# Patient Record
Sex: FEMALE | Race: BLACK OR AFRICAN AMERICAN | NOT HISPANIC OR LATINO | Employment: UNEMPLOYED | URBAN - METROPOLITAN AREA
[De-identification: names, ages, dates, MRNs, and addresses within clinical notes are randomized per-mention and may not be internally consistent; named-entity substitution may affect disease eponyms.]

---

## 2020-03-18 ENCOUNTER — HOSPITAL ENCOUNTER (OUTPATIENT)
Dept: URGENT CARE | Facility: CLINIC | Age: 30
Discharge: HOME | End: 2020-03-18

## 2020-03-20 LAB — BACTERIA SPEC AEROBE CULT: NORMAL

## 2022-01-03 ENCOUNTER — INITIAL PRENATAL (OUTPATIENT)
Dept: OBSTETRICS AND GYNECOLOGY | Facility: CLINIC | Age: 32
End: 2022-01-03

## 2022-01-03 VITALS — SYSTOLIC BLOOD PRESSURE: 139 MMHG | DIASTOLIC BLOOD PRESSURE: 95 MMHG | WEIGHT: 213 LBS

## 2022-01-03 DIAGNOSIS — Z34.80 SUPERVISION OF OTHER NORMAL PREGNANCY: Primary | ICD-10-CM

## 2022-01-03 LAB
GLUCOSE UR STRIP-MCNC: NEGATIVE MG/DL
PROT UR STRIP-MCNC: NEGATIVE MG/DL

## 2022-01-03 PROCEDURE — 0501F PRENATAL FLOW SHEET: CPT | Performed by: OBSTETRICS & GYNECOLOGY

## 2022-01-03 RX ORDER — PRENATAL WITH FERROUS FUM AND FOLIC ACID 3080; 920; 120; 400; 22; 1.84; 3; 20; 10; 1; 12; 200; 27; 25; 2 [IU]/1; [IU]/1; MG/1; [IU]/1; MG/1; MG/1; MG/1; MG/1; MG/1; MG/1; UG/1; MG/1; MG/1; MG/1; MG/1
1 TABLET ORAL DAILY
COMMUNITY
Start: 2021-12-28 | End: 2022-03-10 | Stop reason: SDUPTHER

## 2022-01-03 RX ORDER — ONDANSETRON 4 MG/1
4 TABLET, FILM COATED ORAL
COMMUNITY
Start: 2022-01-03 | End: 2022-01-10

## 2022-01-03 NOTE — PROGRESS NOTES
Ultrasound today 8 weeks 5 days gestation with EDC 8/10/2022, which is only 6 days different than LMP EDC 2022 which patient was pretty certain about.  Fetal heart rate 171.  When scheduling repeat , will use 2022 EDC in order to be conservative.

## 2022-01-03 NOTE — PROGRESS NOTES
CC initial OB visit for this 31-year-old G3, P1 miscarriage 1 female, 50% Afro-American, who had a previous  for failure to progress desiring repeat , and her last pregnancy resulted in a spontaneous miscarriage requiring D&C.  Patient is extremely anxious today due to her previous miscarriage and therefore her blood pressure was slightly elevated as she has no history.  She is on prenatal vitamins.  Discussed the possibility of CF and fetal DNA testing but she is not interested at this time as VIP would never be desired.  She is due for a Pap smear.  Review of Systems - ENT ROS: negative  Hematological and Lymphatic ROS: negative  Endocrine ROS: negative  Breast ROS: negative  Respiratory ROS: negative  Cardiovascular ROS: negative  Gastrointestinal ROS: negative  Genito-Urinary ROS: negative  Musculoskeletal ROS: negative  Neurological ROS: negative  Dermatological ROS: negative  Assessment.  7 weeks 6 days gestation with history of previous  planning repeat   Plan.  Ultrasound today for dating and viability, urine culture, Pap with DNA, CARMEN globin electrophoresis, hemoglobin A1c, TFTs, urine culture, prenatal 3, routine drug screen, return to office 4 weeks if ultrasound fine today.

## 2022-01-04 LAB
ABO GROUP BLD: ABNORMAL
AMPHETAMINES UR QL SCN: NEGATIVE NG/ML
BARBITURATES UR QL SCN: NEGATIVE NG/ML
BASOPHILS # BLD AUTO: 0.1 X10E3/UL (ref 0–0.2)
BASOPHILS NFR BLD AUTO: 1 %
BENZODIAZ UR QL: NEGATIVE NG/ML
BLD GP AB SCN SERPL QL: NEGATIVE
BZE UR QL: NEGATIVE NG/ML
CANNABINOIDS UR QL SCN: NEGATIVE NG/ML
EOSINOPHIL # BLD AUTO: 0.1 X10E3/UL (ref 0–0.4)
EOSINOPHIL NFR BLD AUTO: 1 %
ERYTHROCYTE [DISTWIDTH] IN BLOOD BY AUTOMATED COUNT: 13.2 % (ref 11.7–15.4)
EST. AVERAGE GLUCOSE BLD GHB EST-MCNC: 111 MG/DL
FT4I SERPL CALC-MCNC: 1.7 (ref 1.2–4.9)
HBA1C MFR BLD: 5.5 % (ref 4.8–5.6)
HBV SURFACE AG SERPL QL IA: NEGATIVE
HCT VFR BLD AUTO: 41.3 % (ref 34–46.6)
HCV AB S/CO SERPL IA: <0.1 S/CO RATIO (ref 0–0.9)
HGB A MFR BLD ELPH: 97.4 % (ref 96.4–98.8)
HGB A2 MFR BLD ELPH: 2.6 % (ref 1.8–3.2)
HGB BLD-MCNC: 14.4 G/DL (ref 11.1–15.9)
HGB F MFR BLD ELPH: 0 % (ref 0–2)
HGB FRACT BLD-IMP: NORMAL
HGB S MFR BLD ELPH: 0 %
HIV 1+2 AB+HIV1 P24 AG SERPL QL IA: NON REACTIVE
IMM GRANULOCYTES # BLD AUTO: 0.1 X10E3/UL (ref 0–0.1)
IMM GRANULOCYTES NFR BLD AUTO: 1 %
LYMPHOCYTES # BLD AUTO: 2.8 X10E3/UL (ref 0.7–3.1)
LYMPHOCYTES NFR BLD AUTO: 24 %
MCH RBC QN AUTO: 29.8 PG (ref 26.6–33)
MCHC RBC AUTO-ENTMCNC: 34.9 G/DL (ref 31.5–35.7)
MCV RBC AUTO: 85 FL (ref 79–97)
METHADONE UR QL SCN: NEGATIVE NG/ML
MONOCYTES # BLD AUTO: 0.9 X10E3/UL (ref 0.1–0.9)
MONOCYTES NFR BLD AUTO: 8 %
NEUTROPHILS # BLD AUTO: 7.6 X10E3/UL (ref 1.4–7)
NEUTROPHILS NFR BLD AUTO: 65 %
OPIATES UR QL: NEGATIVE NG/ML
PCP UR QL: NEGATIVE NG/ML
PLATELET # BLD AUTO: 287 X10E3/UL (ref 150–450)
PROPOXYPH UR QL SCN: NEGATIVE NG/ML
RBC # BLD AUTO: 4.84 X10E6/UL (ref 3.77–5.28)
RH BLD: POSITIVE
RPR SER QL: NON REACTIVE
RUBV IGG SERPL IA-ACNC: 2.25 INDEX
T3RU NFR SERPL: 22 % (ref 24–39)
T4 SERPL-MCNC: 7.6 UG/DL (ref 4.5–12)
TSH SERPL DL<=0.005 MIU/L-ACNC: 1.36 UIU/ML (ref 0.45–4.5)
WBC # BLD AUTO: 11.4 X10E3/UL (ref 3.4–10.8)

## 2022-01-05 LAB
BACTERIA UR CULT: NORMAL
BACTERIA UR CULT: NORMAL
C TRACH RRNA CVX QL NAA+PROBE: NEGATIVE
CONV .: NORMAL
CYTOLOGIST CVX/VAG CYTO: NORMAL
CYTOLOGY CVX/VAG DOC CYTO: NORMAL
CYTOLOGY CVX/VAG DOC THIN PREP: NORMAL
DX ICD CODE: NORMAL
HIV 1 & 2 AB SER-IMP: NORMAL
N GONORRHOEA RRNA CVX QL NAA+PROBE: NEGATIVE
OTHER STN SPEC: NORMAL
STAT OF ADQ CVX/VAG CYTO-IMP: NORMAL

## 2022-01-27 ENCOUNTER — ROUTINE PRENATAL (OUTPATIENT)
Dept: OBSTETRICS AND GYNECOLOGY | Facility: CLINIC | Age: 32
End: 2022-01-27

## 2022-01-27 VITALS — SYSTOLIC BLOOD PRESSURE: 125 MMHG | DIASTOLIC BLOOD PRESSURE: 81 MMHG | WEIGHT: 211 LBS

## 2022-01-27 DIAGNOSIS — Z34.80 SUPERVISION OF OTHER NORMAL PREGNANCY: Primary | ICD-10-CM

## 2022-01-27 LAB
GLUCOSE UR STRIP-MCNC: NEGATIVE MG/DL
PROT UR STRIP-MCNC: NEGATIVE MG/DL

## 2022-01-27 PROCEDURE — 0502F SUBSEQUENT PRENATAL CARE: CPT | Performed by: OBSTETRICS & GYNECOLOGY

## 2022-01-27 NOTE — PROGRESS NOTES
CC follow-up OB visit.  Patient anxious because she had a previous miscarriage and did have some cramping without bleeding.  No other problems.  Has decided against CF and Materna 21 fetal DNA testing as never interested in VIP anyway.  Good fetal heart tones today 164.  Cervix checked and long and closed.  Return to office 4 weeks.

## 2022-01-29 LAB
BACTERIA UR CULT: NORMAL
BACTERIA UR CULT: NORMAL

## 2022-01-31 ENCOUNTER — TELEPHONE (OUTPATIENT)
Dept: OBSTETRICS AND GYNECOLOGY | Facility: CLINIC | Age: 32
End: 2022-01-31

## 2022-01-31 NOTE — TELEPHONE ENCOUNTER
----- Message from Darby Ferreira MD sent at 1/31/2022  8:03 AM EST -----  Please let patient know that her urine culture was negative for infection

## 2022-03-02 ENCOUNTER — ROUTINE PRENATAL (OUTPATIENT)
Dept: OBSTETRICS AND GYNECOLOGY | Facility: CLINIC | Age: 32
End: 2022-03-02

## 2022-03-02 VITALS
DIASTOLIC BLOOD PRESSURE: 83 MMHG | SYSTOLIC BLOOD PRESSURE: 126 MMHG | WEIGHT: 212 LBS | BODY MASS INDEX: 33.27 KG/M2 | HEIGHT: 67 IN

## 2022-03-02 DIAGNOSIS — Z34.80 SUPERVISION OF OTHER NORMAL PREGNANCY: Primary | ICD-10-CM

## 2022-03-02 LAB
GLUCOSE UR STRIP-MCNC: NEGATIVE MG/DL
PROT UR STRIP-MCNC: NEGATIVE MG/DL

## 2022-03-02 PROCEDURE — 90686 IIV4 VACC NO PRSV 0.5 ML IM: CPT | Performed by: OBSTETRICS & GYNECOLOGY

## 2022-03-02 PROCEDURE — 90471 IMMUNIZATION ADMIN: CPT | Performed by: OBSTETRICS & GYNECOLOGY

## 2022-03-02 PROCEDURE — 0502F SUBSEQUENT PRENATAL CARE: CPT | Performed by: OBSTETRICS & GYNECOLOGY

## 2022-03-02 NOTE — PROGRESS NOTES
CC follow-up OB visit.  Good fetal movement and fetal heart rate.  aFP refused.  Does want flu shot today.  No current problems.  Assessment.  16 weeks 1 day gestation with history of previous  planning repeat   Plan.  Return to office 4 weeks with anatomy scan.

## 2022-03-10 RX ORDER — PRENATAL WITH FERROUS FUM AND FOLIC ACID 3080; 920; 120; 400; 22; 1.84; 3; 20; 10; 1; 12; 200; 27; 25; 2 [IU]/1; [IU]/1; MG/1; [IU]/1; MG/1; MG/1; MG/1; MG/1; MG/1; MG/1; UG/1; MG/1; MG/1; MG/1; MG/1
1 TABLET ORAL DAILY
Qty: 30 TABLET | Refills: 11 | Status: SHIPPED | OUTPATIENT
Start: 2022-03-10 | End: 2023-03-28

## 2022-03-31 ENCOUNTER — ROUTINE PRENATAL (OUTPATIENT)
Dept: OBSTETRICS AND GYNECOLOGY | Facility: CLINIC | Age: 32
End: 2022-03-31

## 2022-03-31 VITALS — BODY MASS INDEX: 33.61 KG/M2 | SYSTOLIC BLOOD PRESSURE: 120 MMHG | DIASTOLIC BLOOD PRESSURE: 86 MMHG | WEIGHT: 214.6 LBS

## 2022-03-31 DIAGNOSIS — Z34.80 SUPERVISION OF OTHER NORMAL PREGNANCY: Primary | ICD-10-CM

## 2022-03-31 LAB
GLUCOSE UR STRIP-MCNC: NEGATIVE MG/DL
PROT UR STRIP-MCNC: ABNORMAL MG/DL

## 2022-03-31 PROCEDURE — 0502F SUBSEQUENT PRENATAL CARE: CPT | Performed by: OBSTETRICS & GYNECOLOGY

## 2022-03-31 NOTE — PROGRESS NOTES
CC follow-up OB visit.  Normal anatomy screen today.  Cervical length 6 cm.  Fetal heart rate 159.  Anterior low-lying placenta.  Good fetal movement and growth.  Return to office 4 weeks with 1 hour glucose.

## 2022-04-29 PROCEDURE — 99282 EMERGENCY DEPT VISIT SF MDM: CPT

## 2022-04-30 ENCOUNTER — HOSPITAL ENCOUNTER (EMERGENCY)
Facility: HOSPITAL | Age: 32
Discharge: HOME | End: 2022-04-30
Attending: EMERGENCY MEDICINE | Admitting: EMERGENCY MEDICINE

## 2022-04-30 VITALS
TEMPERATURE: 97.8 F | SYSTOLIC BLOOD PRESSURE: 126 MMHG | HEIGHT: 67 IN | HEART RATE: 96 BPM | OXYGEN SATURATION: 100 % | DIASTOLIC BLOOD PRESSURE: 82 MMHG | WEIGHT: 222.44 LBS | RESPIRATION RATE: 20 BRPM | BODY MASS INDEX: 34.91 KG/M2

## 2022-04-30 DIAGNOSIS — R21 GENERALIZED RASH: Primary | ICD-10-CM

## 2022-05-04 ENCOUNTER — LAB (OUTPATIENT)
Dept: OBSTETRICS AND GYNECOLOGY | Facility: CLINIC | Age: 32
End: 2022-05-04

## 2022-05-04 ENCOUNTER — ROUTINE PRENATAL (OUTPATIENT)
Dept: OBSTETRICS AND GYNECOLOGY | Facility: CLINIC | Age: 32
End: 2022-05-04

## 2022-05-04 VITALS — WEIGHT: 221.6 LBS | BODY MASS INDEX: 34.71 KG/M2 | DIASTOLIC BLOOD PRESSURE: 84 MMHG | SYSTOLIC BLOOD PRESSURE: 118 MMHG

## 2022-05-04 DIAGNOSIS — Z98.891 HISTORY OF C-SECTION: Primary | ICD-10-CM

## 2022-05-04 DIAGNOSIS — Z34.80 SUPERVISION OF OTHER NORMAL PREGNANCY: ICD-10-CM

## 2022-05-04 LAB
GLUCOSE UR STRIP-MCNC: NEGATIVE MG/DL
PROT UR STRIP-MCNC: NEGATIVE MG/DL

## 2022-05-04 PROCEDURE — 0502F SUBSEQUENT PRENATAL CARE: CPT | Performed by: OBSTETRICS & GYNECOLOGY

## 2022-05-04 RX ORDER — SODIUM CHLORIDE 0.9 % (FLUSH) 0.9 %
10 SYRINGE (ML) INJECTION AS NEEDED
Status: CANCELLED | OUTPATIENT
Start: 2022-05-04

## 2022-05-04 RX ORDER — METHYLERGONOVINE MALEATE 0.2 MG/ML
200 INJECTION INTRAVENOUS ONCE AS NEEDED
Status: CANCELLED | OUTPATIENT
Start: 2022-05-04

## 2022-05-04 RX ORDER — LIDOCAINE HYDROCHLORIDE 10 MG/ML
5 INJECTION, SOLUTION EPIDURAL; INFILTRATION; INTRACAUDAL; PERINEURAL AS NEEDED
Status: CANCELLED | OUTPATIENT
Start: 2022-05-04

## 2022-05-04 RX ORDER — SODIUM CHLORIDE 0.9 % (FLUSH) 0.9 %
10 SYRINGE (ML) INJECTION EVERY 12 HOURS SCHEDULED
Status: CANCELLED | OUTPATIENT
Start: 2022-05-04

## 2022-05-04 RX ORDER — CARBOPROST TROMETHAMINE 250 UG/ML
250 INJECTION, SOLUTION INTRAMUSCULAR
Status: CANCELLED | OUTPATIENT
Start: 2022-05-04

## 2022-05-04 RX ORDER — MISOPROSTOL 100 UG/1
800 TABLET ORAL ONCE AS NEEDED
Status: CANCELLED | OUTPATIENT
Start: 2022-05-04

## 2022-05-04 NOTE — PROGRESS NOTES
CC follow-up OB visit.  Good fetal movement and growth.  1 hour glucose today.  Planning repeat  2022 without tubal ligation.  Return to office 4 weeks.

## 2022-05-05 PROBLEM — Z98.891 HISTORY OF C-SECTION: Status: ACTIVE | Noted: 2022-05-05

## 2022-05-05 LAB
BASOPHILS # BLD AUTO: 0.1 X10E3/UL (ref 0–0.2)
BASOPHILS NFR BLD AUTO: 1 %
BLD GP AB SCN SERPL QL: NEGATIVE
EOSINOPHIL # BLD AUTO: 0.2 X10E3/UL (ref 0–0.4)
EOSINOPHIL NFR BLD AUTO: 1 %
ERYTHROCYTE [DISTWIDTH] IN BLOOD BY AUTOMATED COUNT: 13.1 % (ref 11.7–15.4)
GLUCOSE 1H P 50 G GLC PO SERPL-MCNC: 93 MG/DL (ref 65–139)
HCT VFR BLD AUTO: 38.7 % (ref 34–46.6)
HGB BLD-MCNC: 13 G/DL (ref 11.1–15.9)
IMM GRANULOCYTES # BLD AUTO: 0.2 X10E3/UL (ref 0–0.1)
IMM GRANULOCYTES NFR BLD AUTO: 2 %
LYMPHOCYTES # BLD AUTO: 2 X10E3/UL (ref 0.7–3.1)
LYMPHOCYTES NFR BLD AUTO: 16 %
MCH RBC QN AUTO: 29.3 PG (ref 26.6–33)
MCHC RBC AUTO-ENTMCNC: 33.6 G/DL (ref 31.5–35.7)
MCV RBC AUTO: 87 FL (ref 79–97)
MONOCYTES # BLD AUTO: 0.8 X10E3/UL (ref 0.1–0.9)
MONOCYTES NFR BLD AUTO: 6 %
NEUTROPHILS # BLD AUTO: 9.4 X10E3/UL (ref 1.4–7)
NEUTROPHILS NFR BLD AUTO: 74 %
PLATELET # BLD AUTO: 262 X10E3/UL (ref 150–450)
RBC # BLD AUTO: 4.44 X10E6/UL (ref 3.77–5.28)
WBC # BLD AUTO: 12.6 X10E3/UL (ref 3.4–10.8)

## 2022-06-01 ENCOUNTER — ROUTINE PRENATAL (OUTPATIENT)
Dept: OBSTETRICS AND GYNECOLOGY | Facility: CLINIC | Age: 32
End: 2022-06-01

## 2022-06-01 VITALS — DIASTOLIC BLOOD PRESSURE: 87 MMHG | WEIGHT: 225 LBS | SYSTOLIC BLOOD PRESSURE: 136 MMHG | BODY MASS INDEX: 35.24 KG/M2

## 2022-06-01 DIAGNOSIS — Z34.93 PRENATAL CARE IN THIRD TRIMESTER: ICD-10-CM

## 2022-06-01 DIAGNOSIS — Z98.891 HISTORY OF C-SECTION: ICD-10-CM

## 2022-06-01 DIAGNOSIS — Z3A.29 29 WEEKS GESTATION OF PREGNANCY: Primary | ICD-10-CM

## 2022-06-01 LAB
GLUCOSE UR STRIP-MCNC: NEGATIVE MG/DL
GLUCOSE UR STRIP-MCNC: NEGATIVE MG/DL
PROT UR STRIP-MCNC: ABNORMAL MG/DL

## 2022-06-01 PROCEDURE — 90715 TDAP VACCINE 7 YRS/> IM: CPT | Performed by: STUDENT IN AN ORGANIZED HEALTH CARE EDUCATION/TRAINING PROGRAM

## 2022-06-01 PROCEDURE — 90471 IMMUNIZATION ADMIN: CPT | Performed by: STUDENT IN AN ORGANIZED HEALTH CARE EDUCATION/TRAINING PROGRAM

## 2022-06-01 PROCEDURE — 0502F SUBSEQUENT PRENATAL CARE: CPT | Performed by: STUDENT IN AN ORGANIZED HEALTH CARE EDUCATION/TRAINING PROGRAM

## 2022-06-01 NOTE — PROGRESS NOTES
Chief Complaint   Patient presents with   • Routine Prenatal Visit      America Andres is a 31 y.o.  at 29w1d who presents for routine prenatal visit. She reports doing well and denies any complaints today. Denies vaginal bleeding, cramping, contractions, LOF. She reports active fetal movement.     /87   Wt 102 kg (225 lb)   LMP 2021   BMI 35.24 kg/m²    Gen: well appearing, NAD   Abd: gravid, nontender  See OB Flowsheet    ASSESSMENT:   1. IUP at 29w1d   2. Prenatal care in third trimester  3. History of C/S x 1   4. Anxiety - on zoloft 50 mg daily     PLAN:  Problem list reviewed and updated.   Reviewed expectations of this stage of pregnancy.   Third trimester precautions reviewed including labor signs, monitoring fetal movements.  Tdap discussed and given today.   Scheduled for repeat  section with Dr. Zhou on 22.   Return in about 2 weeks (around 6/15/2022) for prenatal visit with Dr. Zhou .    Patient Active Problem List    Diagnosis Date Noted   • History of  2022     Note Last Updated: 2022     Added automatically from request for surgery [id number]         Orders Placed This Encounter   Procedures   • Tdap Vaccine Greater Than or Equal To 8yo IM   • POC Urinalysis Dipstick     This is an external result entered through the Results Console.     Order Specific Question:   Release to patient     Answer:   Immediate   • POC Urinalysis Dipstick     This is an external result entered through the Results Console.     Order Specific Question:   Release to patient     Answer:   Immediate     Selena Vergara MD

## 2022-06-23 ENCOUNTER — ROUTINE PRENATAL (OUTPATIENT)
Dept: OBSTETRICS AND GYNECOLOGY | Facility: CLINIC | Age: 32
End: 2022-06-23

## 2022-06-23 VITALS — BODY MASS INDEX: 35.71 KG/M2 | WEIGHT: 228 LBS | DIASTOLIC BLOOD PRESSURE: 87 MMHG | SYSTOLIC BLOOD PRESSURE: 133 MMHG

## 2022-06-23 DIAGNOSIS — Z3A.32 32 WEEKS GESTATION OF PREGNANCY: Primary | ICD-10-CM

## 2022-06-23 DIAGNOSIS — R03.0 ELEVATED BP WITHOUT DIAGNOSIS OF HYPERTENSION: ICD-10-CM

## 2022-06-23 DIAGNOSIS — Z98.891 HISTORY OF C-SECTION: ICD-10-CM

## 2022-06-23 DIAGNOSIS — F41.9 ANXIETY: ICD-10-CM

## 2022-06-23 LAB
GLUCOSE UR STRIP-MCNC: NEGATIVE MG/DL
PROT UR STRIP-MCNC: NEGATIVE MG/DL

## 2022-06-23 PROCEDURE — 0502F SUBSEQUENT PRENATAL CARE: CPT | Performed by: NURSE PRACTITIONER

## 2022-06-23 NOTE — PROGRESS NOTES
Chief Complaint   Patient presents with   • Routine Prenatal Visit     OB follow up     America Andres is a 31 y.o.  32w2d being seen today for her obstetrical visit.  Patient reports no complaints. Fetal movement: normal. BP is borderline range today, denies current HA, however she has had sinus infection with c/o intermittent HA which is relieved by tylenol. Denies vision changes, or RUQ pain    Review of Systems  Cramping/contractions: denies  Vaginal bleeding: denies  Fetal movement: normal    /87   Wt 103 kg (228 lb)   LMP 2021   BMI 35.71 kg/m²     Assessment/Plan    Diagnoses and all orders for this visit:    1. 32 weeks gestation of pregnancy (Primary)  -     US ob follow up transabdominal approach; Future    2. History of     3. Anxiety    4. Elevated BP without diagnosis of hypertension       Scheduled repeat c/s 22   Reviewed fetal kick counts  Reviewed preeclampsia precautions  Discussed S&S PTL  Encouraged proper hydration  Growth scan at next visit  Reviewed this stage of pregnancy  Problem list updated     Follow up in 2 week(s) with Dr. Vicente    I have spent 20 min in face to face time with the patient and 20 min of this time was spent in counseling on the above stated issues.    Rossy Hare, APRN  2022  13:33 EDT

## 2022-06-27 DIAGNOSIS — O40.3XX0 POLYHYDRAMNIOS IN THIRD TRIMESTER COMPLICATION, SINGLE OR UNSPECIFIED FETUS: Primary | ICD-10-CM

## 2022-07-07 ENCOUNTER — ROUTINE PRENATAL (OUTPATIENT)
Dept: OBSTETRICS AND GYNECOLOGY | Facility: CLINIC | Age: 32
End: 2022-07-07

## 2022-07-07 VITALS — WEIGHT: 228 LBS | BODY MASS INDEX: 35.71 KG/M2 | DIASTOLIC BLOOD PRESSURE: 78 MMHG | SYSTOLIC BLOOD PRESSURE: 120 MMHG

## 2022-07-07 DIAGNOSIS — Z34.83 PRENATAL CARE, SUBSEQUENT PREGNANCY IN THIRD TRIMESTER: Primary | ICD-10-CM

## 2022-07-07 LAB
GLUCOSE UR STRIP-MCNC: NEGATIVE MG/DL
PROT UR STRIP-MCNC: NEGATIVE MG/DL

## 2022-07-07 PROCEDURE — 0502F SUBSEQUENT PRENATAL CARE: CPT | Performed by: OBSTETRICS & GYNECOLOGY

## 2022-07-07 RX ORDER — HYDROCORTISONE ACETATE PRAMOXINE HCL 2.5; 1 G/100G; G/100G
CREAM TOPICAL
Qty: 30 G | Refills: 0 | Status: SHIPPED | OUTPATIENT
Start: 2022-07-07

## 2022-07-07 RX ORDER — HYDROCORTISONE ACETATE PRAMOXINE HCL 2.5; 1 G/100G; G/100G
CREAM TOPICAL
Qty: 30 G | Refills: 1 | Status: ON HOLD | OUTPATIENT
Start: 2022-07-07 | End: 2022-08-09

## 2022-07-07 NOTE — PROGRESS NOTES
CC follow-up OB visit.  Ultrasound today 5 pounds 8 ounces.  51 percentile.  ARLYN 16 cm.  Fetal heart rate 161.  Vertex.  Good fetal movement and growth.  Repeat  scheduled.  Received Tdap last month.  Wanting Analpram HC for hemorrhoids.  Return to office 2 weeks.

## 2022-07-21 ENCOUNTER — ROUTINE PRENATAL (OUTPATIENT)
Dept: OBSTETRICS AND GYNECOLOGY | Facility: CLINIC | Age: 32
End: 2022-07-21

## 2022-07-21 VITALS — BODY MASS INDEX: 35.71 KG/M2 | DIASTOLIC BLOOD PRESSURE: 80 MMHG | SYSTOLIC BLOOD PRESSURE: 130 MMHG | WEIGHT: 228 LBS

## 2022-07-21 DIAGNOSIS — Z34.83 PRENATAL CARE, SUBSEQUENT PREGNANCY IN THIRD TRIMESTER: Primary | ICD-10-CM

## 2022-07-21 LAB
GLUCOSE UR STRIP-MCNC: NEGATIVE MG/DL
PROT UR STRIP-MCNC: ABNORMAL MG/DL

## 2022-07-21 PROCEDURE — 0502F SUBSEQUENT PRENATAL CARE: CPT | Performed by: OBSTETRICS & GYNECOLOGY

## 2022-07-21 NOTE — PROGRESS NOTES
CC follow-up OB visit.  Repeat  scheduled.  Good fetal movement and growth.  GBS explained and performed.  No current problems.  Return to office weekly.

## 2022-07-26 LAB
CLINDAMYCIN ISLT KB: NORMAL
GP B STREP DNA SPEC QL NAA+PROBE: POSITIVE
ORGANISM ID: NORMAL

## 2022-07-28 ENCOUNTER — ROUTINE PRENATAL (OUTPATIENT)
Dept: OBSTETRICS AND GYNECOLOGY | Facility: CLINIC | Age: 32
End: 2022-07-28

## 2022-07-28 VITALS — BODY MASS INDEX: 35.87 KG/M2 | DIASTOLIC BLOOD PRESSURE: 82 MMHG | SYSTOLIC BLOOD PRESSURE: 128 MMHG | WEIGHT: 229 LBS

## 2022-07-28 DIAGNOSIS — Z34.83 PRENATAL CARE, SUBSEQUENT PREGNANCY IN THIRD TRIMESTER: Primary | ICD-10-CM

## 2022-07-28 DIAGNOSIS — Z98.891 HISTORY OF C-SECTION: ICD-10-CM

## 2022-07-28 LAB
GLUCOSE UR STRIP-MCNC: NEGATIVE MG/DL
PROT UR STRIP-MCNC: ABNORMAL MG/DL

## 2022-07-28 PROCEDURE — 0502F SUBSEQUENT PRENATAL CARE: CPT | Performed by: OBSTETRICS & GYNECOLOGY

## 2022-07-28 NOTE — PROGRESS NOTES
CC follow-up OB visit.  GBS positive.  Discussed with patient.  Repeat  scheduled in 2 weeks.  Good fetal movement and growth.  No significant contractions.  Return to office 1 week.

## 2022-08-04 ENCOUNTER — ROUTINE PRENATAL (OUTPATIENT)
Dept: OBSTETRICS AND GYNECOLOGY | Facility: CLINIC | Age: 32
End: 2022-08-04

## 2022-08-04 VITALS — WEIGHT: 228 LBS | BODY MASS INDEX: 35.71 KG/M2 | DIASTOLIC BLOOD PRESSURE: 82 MMHG | SYSTOLIC BLOOD PRESSURE: 120 MMHG

## 2022-08-04 DIAGNOSIS — Z34.83 PRENATAL CARE, SUBSEQUENT PREGNANCY IN THIRD TRIMESTER: Primary | ICD-10-CM

## 2022-08-04 DIAGNOSIS — Z98.891 HISTORY OF C-SECTION: ICD-10-CM

## 2022-08-04 LAB
GLUCOSE UR STRIP-MCNC: NEGATIVE MG/DL
PROT UR STRIP-MCNC: ABNORMAL MG/DL

## 2022-08-04 PROCEDURE — 0502F SUBSEQUENT PRENATAL CARE: CPT | Performed by: OBSTETRICS & GYNECOLOGY

## 2022-08-04 NOTE — PROGRESS NOTES
CC OB follow-up visit.  GBS positive.  Good fetal movement and growth.  ERAS instructions given.  Patient has repeat  scheduled for next week

## 2022-08-08 PROCEDURE — S0260 H&P FOR SURGERY: HCPCS | Performed by: OBSTETRICS & GYNECOLOGY

## 2022-08-08 NOTE — H&P
H&P Note    Patient Identification:  Name: America Andrse  Age: 31 y.o.  Sex: female  :  1990  MRN: [id number]                       Chief Complaint: Repeat     History of Present Illness:   Patient is a 31-year-old X2D1XT2 female at 39 weeks gestation with a history of a previous  section for failure to progress and is brought in today for a planned repeat .  No major antepartum problems.    [facility]:  [facility]  Past Medical History:  No past medical history on file.  Past Surgical History:  Past Surgical History:   Procedure Laterality Date   •  SECTION     • DILATATION AND CURETTAGE        Home Meds:  No medications prior to admission.     Current Meds:   [unfilled]  Allergies:  No Known Allergies  Immunizations:  Immunization History   Administered Date(s) Administered   • COVID-19 (PFIZER) PURPLE CAP 2021, 10/08/2021   • FluLaval/Fluarix/Fluzone >6 2017, 10/23/2018, 2022   • Tdap 2018, 2022     Social History:   Social History     Tobacco Use   • Smoking status: Never Smoker   • Smokeless tobacco: Not on file   Substance Use Topics   • Alcohol use: Never      Family History:  Family History   Problem Relation Age of Onset   • Breast cancer Mother         Review of Systems  Pertinent items are noted in HPI.    Objective:  tMax 24 hrs: No data recorded.    Vitals Ranges:      Intake and Output Last 3 Shifts:   No intake/output data recorded.    Exam:     General Appearance:    Alert, cooperative, no distress, appears stated age   Head:    Normocephalic, without obvious abnormality, atraumatic   Back:     Symmetric, no curvature, ROM normal, no CVA tenderness   Lungs:     Clear to auscultation bilaterally, respirations unlabored   Chest Wall:    No tenderness or deformity    Heart:    Regular rate and rhythm, S1 and S2 normal, no murmur, rub   or gallop       Abdomen:     Soft, non-tender, EFW 7 1/2 lbs.             Extremities:    Extremities normal, atraumatic, no cyanosis or edema   Skin:   Skin color, texture, turgor normal, no rashes or lesions           Data Review:  GBS positive. O+   rubella status immune  Lab Results (last 24 hours)     ** No results found for the last 24 hours. **        Assessment:    History of       1.  39 weeks gestation with history of previous  here for planned repeat  section under spinal anesthesia  2.  Positive GBS    Plan:  1.  Planned repeat  section under spinal anesthesia.    Patric Zhou MD  2022

## 2022-08-09 ENCOUNTER — ANESTHESIA (OUTPATIENT)
Dept: LABOR AND DELIVERY | Facility: HOSPITAL | Age: 32
End: 2022-08-09

## 2022-08-09 ENCOUNTER — ANESTHESIA EVENT (OUTPATIENT)
Dept: LABOR AND DELIVERY | Facility: HOSPITAL | Age: 32
End: 2022-08-09

## 2022-08-09 ENCOUNTER — HOSPITAL ENCOUNTER (INPATIENT)
Facility: HOSPITAL | Age: 32
LOS: 3 days | Discharge: HOME | End: 2022-08-12
Attending: OBSTETRICS & GYNECOLOGY | Admitting: OBSTETRICS & GYNECOLOGY

## 2022-08-09 DIAGNOSIS — Z98.891 S/P CESAREAN SECTION: Primary | ICD-10-CM

## 2022-08-09 DIAGNOSIS — Z98.891 HISTORY OF C-SECTION: ICD-10-CM

## 2022-08-09 LAB
ABO GROUP BLD: NORMAL
BLD GP AB SCN SERPL QL: NEGATIVE
DEPRECATED RDW RBC AUTO: 42.9 FL (ref 37–54)
ERYTHROCYTE [DISTWIDTH] IN BLOOD BY AUTOMATED COUNT: 13.9 % (ref 12.3–15.4)
HCT VFR BLD AUTO: 37.8 % (ref 34–46.6)
HGB BLD-MCNC: 13.3 G/DL (ref 12–15.9)
MCH RBC QN AUTO: 30 PG (ref 26.6–33)
MCHC RBC AUTO-ENTMCNC: 35.2 G/DL (ref 31.5–35.7)
MCV RBC AUTO: 85.1 FL (ref 79–97)
PLATELET # BLD AUTO: 261 10*3/MM3 (ref 140–450)
PMV BLD AUTO: 10.1 FL (ref 6–12)
RBC # BLD AUTO: 4.44 10*6/MM3 (ref 3.77–5.28)
RH BLD: POSITIVE
SARS-COV-2 RNA PNL SPEC NAA+PROBE: NOT DETECTED
T&S EXPIRATION DATE: NORMAL
WBC NRBC COR # BLD: 11.44 10*3/MM3 (ref 3.4–10.8)

## 2022-08-09 PROCEDURE — 59510 CESAREAN DELIVERY: CPT | Performed by: OBSTETRICS & GYNECOLOGY

## 2022-08-09 PROCEDURE — 87635 SARS-COV-2 COVID-19 AMP PRB: CPT | Performed by: OBSTETRICS & GYNECOLOGY

## 2022-08-09 PROCEDURE — 25010000002 ONDANSETRON PER 1 MG: Performed by: ANESTHESIOLOGY

## 2022-08-09 PROCEDURE — 86900 BLOOD TYPING SEROLOGIC ABO: CPT | Performed by: OBSTETRICS & GYNECOLOGY

## 2022-08-09 PROCEDURE — 25010000002 MORPHINE PER 10 MG: Performed by: ANESTHESIOLOGY

## 2022-08-09 PROCEDURE — 25010000002 KETOROLAC TROMETHAMINE PER 15 MG: Performed by: OBSTETRICS & GYNECOLOGY

## 2022-08-09 PROCEDURE — 25010000002 FENTANYL CITRATE (PF) 50 MCG/ML SOLUTION: Performed by: ANESTHESIOLOGY

## 2022-08-09 PROCEDURE — 85027 COMPLETE CBC AUTOMATED: CPT | Performed by: OBSTETRICS & GYNECOLOGY

## 2022-08-09 PROCEDURE — 25010000002 KETOROLAC TROMETHAMINE PER 15 MG: Performed by: NURSE ANESTHETIST, CERTIFIED REGISTERED

## 2022-08-09 PROCEDURE — 86901 BLOOD TYPING SEROLOGIC RH(D): CPT | Performed by: OBSTETRICS & GYNECOLOGY

## 2022-08-09 PROCEDURE — 59514 CESAREAN DELIVERY ONLY: CPT | Performed by: OBSTETRICS & GYNECOLOGY

## 2022-08-09 PROCEDURE — 25010000002 CEFAZOLIN IN DEXTROSE 2-4 GM/100ML-% SOLUTION: Performed by: OBSTETRICS & GYNECOLOGY

## 2022-08-09 PROCEDURE — 86850 RBC ANTIBODY SCREEN: CPT | Performed by: OBSTETRICS & GYNECOLOGY

## 2022-08-09 PROCEDURE — 25010000002 PHENYLEPHRINE 10 MG/ML SOLUTION: Performed by: NURSE ANESTHETIST, CERTIFIED REGISTERED

## 2022-08-09 RX ORDER — OXYTOCIN/0.9 % SODIUM CHLORIDE 30/500 ML
250 PLASTIC BAG, INJECTION (ML) INTRAVENOUS CONTINUOUS PRN
Status: DISCONTINUED | OUTPATIENT
Start: 2022-08-09 | End: 2022-08-12 | Stop reason: HOSPADM

## 2022-08-09 RX ORDER — BUPIVACAINE HYDROCHLORIDE 7.5 MG/ML
INJECTION, SOLUTION EPIDURAL; RETROBULBAR
Status: COMPLETED | OUTPATIENT
Start: 2022-08-09 | End: 2022-08-09

## 2022-08-09 RX ORDER — SIMETHICONE 80 MG
80 TABLET,CHEWABLE ORAL 4 TIMES DAILY PRN
Status: DISCONTINUED | OUTPATIENT
Start: 2022-08-09 | End: 2022-08-12 | Stop reason: HOSPADM

## 2022-08-09 RX ORDER — FAMOTIDINE 10 MG/ML
20 INJECTION, SOLUTION INTRAVENOUS ONCE AS NEEDED
Status: COMPLETED | OUTPATIENT
Start: 2022-08-09 | End: 2022-08-09

## 2022-08-09 RX ORDER — PRENATAL VIT/IRON FUM/FOLIC AC 27MG-0.8MG
1 TABLET ORAL DAILY
Status: DISCONTINUED | OUTPATIENT
Start: 2022-08-09 | End: 2022-08-12 | Stop reason: HOSPADM

## 2022-08-09 RX ORDER — KETOROLAC TROMETHAMINE 30 MG/ML
INJECTION, SOLUTION INTRAMUSCULAR; INTRAVENOUS AS NEEDED
Status: DISCONTINUED | OUTPATIENT
Start: 2022-08-09 | End: 2022-08-09 | Stop reason: SURG

## 2022-08-09 RX ORDER — MORPHINE SULFATE 1 MG/ML
INJECTION, SOLUTION EPIDURAL; INTRATHECAL; INTRAVENOUS
Status: COMPLETED | OUTPATIENT
Start: 2022-08-09 | End: 2022-08-09

## 2022-08-09 RX ORDER — OXYTOCIN/0.9 % SODIUM CHLORIDE 30/500 ML
999 PLASTIC BAG, INJECTION (ML) INTRAVENOUS CONTINUOUS PRN
Status: COMPLETED | OUTPATIENT
Start: 2022-08-09 | End: 2022-08-09

## 2022-08-09 RX ORDER — ACETAMINOPHEN 325 MG/1
650 TABLET ORAL EVERY 6 HOURS
Status: DISCONTINUED | OUTPATIENT
Start: 2022-08-10 | End: 2022-08-12 | Stop reason: HOSPADM

## 2022-08-09 RX ORDER — OXYTOCIN/0.9 % SODIUM CHLORIDE 30/500 ML
250 PLASTIC BAG, INJECTION (ML) INTRAVENOUS CONTINUOUS PRN
Status: DISCONTINUED | OUTPATIENT
Start: 2022-08-09 | End: 2022-08-09 | Stop reason: SDUPTHER

## 2022-08-09 RX ORDER — ONDANSETRON 2 MG/ML
4 INJECTION INTRAMUSCULAR; INTRAVENOUS EVERY 6 HOURS PRN
Status: DISCONTINUED | OUTPATIENT
Start: 2022-08-09 | End: 2022-08-12 | Stop reason: HOSPADM

## 2022-08-09 RX ORDER — DIPHENHYDRAMINE HCL 25 MG
25 CAPSULE ORAL EVERY 4 HOURS PRN
Status: DISCONTINUED | OUTPATIENT
Start: 2022-08-09 | End: 2022-08-12 | Stop reason: HOSPADM

## 2022-08-09 RX ORDER — HYDROMORPHONE HYDROCHLORIDE 1 MG/ML
0.5 INJECTION, SOLUTION INTRAMUSCULAR; INTRAVENOUS; SUBCUTANEOUS
Status: ACTIVE | OUTPATIENT
Start: 2022-08-09 | End: 2022-08-10

## 2022-08-09 RX ORDER — ONDANSETRON 2 MG/ML
4 INJECTION INTRAMUSCULAR; INTRAVENOUS ONCE AS NEEDED
Status: COMPLETED | OUTPATIENT
Start: 2022-08-09 | End: 2022-08-09

## 2022-08-09 RX ORDER — ONDANSETRON 2 MG/ML
4 INJECTION INTRAMUSCULAR; INTRAVENOUS ONCE AS NEEDED
Status: DISCONTINUED | OUTPATIENT
Start: 2022-08-09 | End: 2022-08-12 | Stop reason: HOSPADM

## 2022-08-09 RX ORDER — ERYTHROMYCIN 5 MG/G
OINTMENT OPHTHALMIC
Status: ACTIVE
Start: 2022-08-09 | End: 2022-08-09

## 2022-08-09 RX ORDER — ACETAMINOPHEN 500 MG
1000 TABLET ORAL EVERY 6 HOURS
Status: COMPLETED | OUTPATIENT
Start: 2022-08-09 | End: 2022-08-10

## 2022-08-09 RX ORDER — FENTANYL CITRATE 50 UG/ML
INJECTION, SOLUTION INTRAMUSCULAR; INTRAVENOUS
Status: COMPLETED | OUTPATIENT
Start: 2022-08-09 | End: 2022-08-09

## 2022-08-09 RX ORDER — IBUPROFEN 600 MG/1
600 TABLET ORAL EVERY 6 HOURS
Status: DISCONTINUED | OUTPATIENT
Start: 2022-08-10 | End: 2022-08-12 | Stop reason: HOSPADM

## 2022-08-09 RX ORDER — KETOROLAC TROMETHAMINE 15 MG/ML
15 INJECTION, SOLUTION INTRAMUSCULAR; INTRAVENOUS EVERY 6 HOURS
Status: COMPLETED | OUTPATIENT
Start: 2022-08-09 | End: 2022-08-10

## 2022-08-09 RX ORDER — MISOPROSTOL 200 UG/1
600 TABLET ORAL ONCE
Status: DISCONTINUED | OUTPATIENT
Start: 2022-08-09 | End: 2022-08-09

## 2022-08-09 RX ORDER — ACETAMINOPHEN 500 MG
1000 TABLET ORAL AS NEEDED
COMMUNITY
End: 2022-08-12 | Stop reason: HOSPADM

## 2022-08-09 RX ORDER — PHENYLEPHRINE HYDROCHLORIDE 10 MG/ML
INJECTION INTRAVENOUS AS NEEDED
Status: DISCONTINUED | OUTPATIENT
Start: 2022-08-09 | End: 2022-08-09 | Stop reason: SURG

## 2022-08-09 RX ORDER — SODIUM CHLORIDE, SODIUM LACTATE, POTASSIUM CHLORIDE, CALCIUM CHLORIDE 600; 310; 30; 20 MG/100ML; MG/100ML; MG/100ML; MG/100ML
125 INJECTION, SOLUTION INTRAVENOUS CONTINUOUS
Status: DISCONTINUED | OUTPATIENT
Start: 2022-08-09 | End: 2022-08-09

## 2022-08-09 RX ORDER — METHYLERGONOVINE MALEATE 0.2 MG/ML
200 INJECTION INTRAVENOUS ONCE AS NEEDED
Status: DISCONTINUED | OUTPATIENT
Start: 2022-08-09 | End: 2022-08-09 | Stop reason: HOSPADM

## 2022-08-09 RX ORDER — NALOXONE HCL 0.4 MG/ML
0.2 VIAL (ML) INJECTION
Status: DISCONTINUED | OUTPATIENT
Start: 2022-08-09 | End: 2022-08-12 | Stop reason: HOSPADM

## 2022-08-09 RX ORDER — DIPHENHYDRAMINE HYDROCHLORIDE 50 MG/ML
25 INJECTION INTRAMUSCULAR; INTRAVENOUS EVERY 4 HOURS PRN
Status: DISCONTINUED | OUTPATIENT
Start: 2022-08-09 | End: 2022-08-12 | Stop reason: HOSPADM

## 2022-08-09 RX ORDER — CARBOPROST TROMETHAMINE 250 UG/ML
250 INJECTION, SOLUTION INTRAMUSCULAR
Status: DISCONTINUED | OUTPATIENT
Start: 2022-08-09 | End: 2022-08-09 | Stop reason: HOSPADM

## 2022-08-09 RX ORDER — SODIUM CHLORIDE 0.9 % (FLUSH) 0.9 %
10 SYRINGE (ML) INJECTION EVERY 12 HOURS SCHEDULED
Status: DISCONTINUED | OUTPATIENT
Start: 2022-08-09 | End: 2022-08-12 | Stop reason: HOSPADM

## 2022-08-09 RX ORDER — ACETAMINOPHEN 500 MG
1000 TABLET ORAL ONCE
Status: COMPLETED | OUTPATIENT
Start: 2022-08-09 | End: 2022-08-09

## 2022-08-09 RX ORDER — LIDOCAINE HYDROCHLORIDE 10 MG/ML
5 INJECTION, SOLUTION EPIDURAL; INFILTRATION; INTRACAUDAL; PERINEURAL AS NEEDED
Status: DISCONTINUED | OUTPATIENT
Start: 2022-08-09 | End: 2022-08-12 | Stop reason: HOSPADM

## 2022-08-09 RX ORDER — OXYTOCIN/0.9 % SODIUM CHLORIDE 30/500 ML
125 PLASTIC BAG, INJECTION (ML) INTRAVENOUS CONTINUOUS PRN
Status: COMPLETED | OUTPATIENT
Start: 2022-08-09 | End: 2022-08-09

## 2022-08-09 RX ORDER — ONDANSETRON 4 MG/1
4 TABLET, FILM COATED ORAL EVERY 8 HOURS PRN
Status: DISCONTINUED | OUTPATIENT
Start: 2022-08-09 | End: 2022-08-12 | Stop reason: HOSPADM

## 2022-08-09 RX ORDER — MORPHINE SULFATE 2 MG/ML
2 INJECTION, SOLUTION INTRAMUSCULAR; INTRAVENOUS
Status: ACTIVE | OUTPATIENT
Start: 2022-08-09 | End: 2022-08-10

## 2022-08-09 RX ORDER — CEFAZOLIN SODIUM 2 G/100ML
2 INJECTION, SOLUTION INTRAVENOUS ONCE
Status: COMPLETED | OUTPATIENT
Start: 2022-08-09 | End: 2022-08-09

## 2022-08-09 RX ORDER — KETOROLAC TROMETHAMINE 30 MG/ML
30 INJECTION, SOLUTION INTRAMUSCULAR; INTRAVENOUS ONCE
Status: DISCONTINUED | OUTPATIENT
Start: 2022-08-09 | End: 2022-08-09

## 2022-08-09 RX ORDER — PHYTONADIONE 1 MG/.5ML
INJECTION, EMULSION INTRAMUSCULAR; INTRAVENOUS; SUBCUTANEOUS
Status: ACTIVE
Start: 2022-08-09 | End: 2022-08-09

## 2022-08-09 RX ORDER — SODIUM CHLORIDE 0.9 % (FLUSH) 0.9 %
10 SYRINGE (ML) INJECTION AS NEEDED
Status: DISCONTINUED | OUTPATIENT
Start: 2022-08-09 | End: 2022-08-09 | Stop reason: HOSPADM

## 2022-08-09 RX ORDER — DOCUSATE SODIUM 100 MG/1
100 CAPSULE, LIQUID FILLED ORAL 2 TIMES DAILY PRN
Status: DISCONTINUED | OUTPATIENT
Start: 2022-08-09 | End: 2022-08-12 | Stop reason: HOSPADM

## 2022-08-09 RX ORDER — BISACODYL 10 MG
10 SUPPOSITORY, RECTAL RECTAL DAILY PRN
Status: DISCONTINUED | OUTPATIENT
Start: 2022-08-09 | End: 2022-08-12 | Stop reason: HOSPADM

## 2022-08-09 RX ORDER — MISOPROSTOL 200 UG/1
800 TABLET ORAL ONCE AS NEEDED
Status: DISCONTINUED | OUTPATIENT
Start: 2022-08-09 | End: 2022-08-09 | Stop reason: HOSPADM

## 2022-08-09 RX ORDER — OXYCODONE HYDROCHLORIDE 5 MG/1
10 TABLET ORAL EVERY 4 HOURS PRN
Status: DISCONTINUED | OUTPATIENT
Start: 2022-08-09 | End: 2022-08-12 | Stop reason: HOSPADM

## 2022-08-09 RX ORDER — OXYCODONE HYDROCHLORIDE 5 MG/1
5 TABLET ORAL EVERY 4 HOURS PRN
Status: DISCONTINUED | OUTPATIENT
Start: 2022-08-09 | End: 2022-08-12 | Stop reason: HOSPADM

## 2022-08-09 RX ADMIN — SODIUM CHLORIDE, POTASSIUM CHLORIDE, SODIUM LACTATE AND CALCIUM CHLORIDE 1000 ML: 600; 310; 30; 20 INJECTION, SOLUTION INTRAVENOUS at 07:42

## 2022-08-09 RX ADMIN — PHENYLEPHRINE HYDROCHLORIDE 100 MCG: 10 INJECTION INTRAVENOUS at 10:24

## 2022-08-09 RX ADMIN — CEFAZOLIN SODIUM 2 G: 2 INJECTION, SOLUTION INTRAVENOUS at 09:51

## 2022-08-09 RX ADMIN — FAMOTIDINE 20 MG: 10 INJECTION INTRAVENOUS at 09:32

## 2022-08-09 RX ADMIN — ACETAMINOPHEN 1000 MG: 500 TABLET, FILM COATED ORAL at 15:26

## 2022-08-09 RX ADMIN — SODIUM CHLORIDE, POTASSIUM CHLORIDE, SODIUM LACTATE AND CALCIUM CHLORIDE 125 ML/HR: 600; 310; 30; 20 INJECTION, SOLUTION INTRAVENOUS at 08:46

## 2022-08-09 RX ADMIN — FENTANYL CITRATE 15 MCG: 0.05 INJECTION, SOLUTION INTRAMUSCULAR; INTRAVENOUS at 10:19

## 2022-08-09 RX ADMIN — KETOROLAC TROMETHAMINE 30 MG: 30 INJECTION, SOLUTION INTRAMUSCULAR; INTRAVENOUS at 11:07

## 2022-08-09 RX ADMIN — ACETAMINOPHEN 1000 MG: 500 TABLET, FILM COATED ORAL at 21:54

## 2022-08-09 RX ADMIN — PHENYLEPHRINE HYDROCHLORIDE 100 MCG: 10 INJECTION INTRAVENOUS at 10:36

## 2022-08-09 RX ADMIN — ACETAMINOPHEN 1000 MG: 500 TABLET, FILM COATED ORAL at 09:34

## 2022-08-09 RX ADMIN — MORPHINE SULFATE 200 MCG: 1 INJECTION, SOLUTION EPIDURAL; INTRATHECAL; INTRAVENOUS at 10:19

## 2022-08-09 RX ADMIN — ONDANSETRON 4 MG: 2 INJECTION INTRAMUSCULAR; INTRAVENOUS at 09:35

## 2022-08-09 RX ADMIN — KETOROLAC TROMETHAMINE 15 MG: 15 INJECTION, SOLUTION INTRAMUSCULAR; INTRAVENOUS at 18:30

## 2022-08-09 RX ADMIN — Medication 125 ML/HR: at 12:05

## 2022-08-09 RX ADMIN — BUPIVACAINE HYDROCHLORIDE 1.8 ML: 7.5 INJECTION, SOLUTION EPIDURAL; RETROBULBAR at 10:19

## 2022-08-09 RX ADMIN — Medication 999 ML/HR: at 10:41

## 2022-08-09 NOTE — PLAN OF CARE
Problem: Adult Inpatient Plan of Care  Goal: Plan of Care Review  Outcome: Ongoing, Progressing  Flowsheets (Taken 8/9/2022 1154)  Progress: improving  Plan of Care Reviewed With:   patient   spouse  Outcome Evaluation: C/S delivery at 1040. Patient currently in MARCO with infant with  at bedside, attentive to patient and infant.  Goal: Patient-Specific Goal (Individualized)  Outcome: Ongoing, Progressing  Flowsheets (Taken 8/9/2022 1154)  Patient-Specific Goals (Include Timeframe): Healthy mom and baby by anil  Individualized Care Needs: MARCO, 2nd baby, Breastfeeding  Anxieties, Fears or Concerns: None noted  Goal: Absence of Hospital-Acquired Illness or Injury  Outcome: Ongoing, Progressing  Intervention: Identify and Manage Fall Risk  Recent Flowsheet Documentation  Taken 8/9/2022 1145 by Jessie Pena RN  Safety Promotion/Fall Prevention: safety round/check completed  Intervention: Prevent Skin Injury  Recent Flowsheet Documentation  Taken 8/9/2022 1145 by Jessie Pena RN  Body Position: sitting up in bed  Intervention: Prevent and Manage VTE (Venous Thromboembolism) Risk  Recent Flowsheet Documentation  Taken 8/9/2022 1145 by Jessie Pena RN  Activity Management: activity adjusted per tolerance  VTE Prevention/Management:   bilateral   sequential compression devices on  Taken 8/9/2022 1124 by Jessie Pena RN  Activity Management: activity adjusted per tolerance  VTE Prevention/Management:   bilateral   sequential compression devices on  Goal: Optimal Comfort and Wellbeing  Outcome: Ongoing, Progressing  Intervention: Provide Person-Centered Care  Recent Flowsheet Documentation  Taken 8/9/2022 1124 by Jessie Pena RN  Trust Relationship/Rapport:   care explained   choices provided   questions answered   questions encouraged   thoughts/feelings acknowledged  Goal: Readiness for Transition of Care  Outcome: Ongoing, Progressing  Intervention: Mutually Develop Transition Plan  Recent Flowsheet  Documentation  Taken 8/9/2022 0807 by Jessie Pena, RN  Equipment Currently Used at Home: none  Taken 8/9/2022 0803 by Jessie Pena, RN  Transportation Anticipated:   car, drives self   family or friend will provide  Patient/Family Anticipated Services at Transition: none  Patient/Family Anticipates Transition to:   home   home with family   Goal Outcome Evaluation:  Plan of Care Reviewed With: patient, spouse        Progress: improving  Outcome Evaluation: C/S delivery at 1040. Patient currently in MARCO with infant with  at bedside, attentive to patient and infant.

## 2022-08-09 NOTE — ANESTHESIA POSTPROCEDURE EVALUATION
"Patient: America Andres    Procedure Summary     Date: 22 Room / Location:  [facility]    Anesthesia Start: 955 Anesthesia Stop:     Procedure:  SECTION REPEAT (N/A Abdomen) Diagnosis:       History of       (History of  [Z98.891])    Surgeons: Patric Zhou MD Provider: Patric Street MD    Anesthesia Type: epidural ASA Status: 2          Anesthesia Type: epidural    Vitals  Vitals Value Taken Time   /73 22 1500   Temp 36.3 °C (97.4 °F) 22 1500   Pulse 67 22 1500   Resp 18 22 1500   SpO2 97 % 22 1500           Post Anesthesia Care and Evaluation    Patient location during evaluation: bedside  Patient participation: complete - patient participated  Level of consciousness: awake and alert  Pain management: adequate    Airway patency: patent  Anesthetic complications: No anesthetic complications    Cardiovascular status: acceptable  Respiratory status: acceptable  Hydration status: acceptable    Comments: /73 (BP Location: Right arm, Patient Position: Sitting)   Pulse 67   Temp 36.3 °C (97.4 °F) (Oral)   Resp 18   Ht 170.2 cm (67\")   Wt 105 kg (231 lb)   LMP 2021   SpO2 97%   Breastfeeding Yes   BMI 36.18 kg/m²       "

## 2022-08-09 NOTE — PLAN OF CARE
Goal Outcome Evaluation:  Plan of Care Reviewed With: patient      VS stable. Abdominal dressing dry and intact. Fundus firm and midline. No excessive lochia. Olguin remains patent and draining. + bonding with . Ambulates without symptoms.

## 2022-08-09 NOTE — ANESTHESIA PREPROCEDURE EVALUATION
Anesthesia Evaluation     Patient summary reviewed and Nursing notes reviewed   NPO Solid Status: > 8 hours             Airway   Mallampati: II  TM distance: >3 FB  Neck ROM: full  no difficulty expected  Dental - normal exam     Pulmonary - normal exam   Cardiovascular - normal exam        Neuro/Psych  GI/Hepatic/Renal/Endo      Musculoskeletal     Abdominal  - normal exam   Substance History      OB/GYN    (+) Pregnant,         Other                        Anesthesia Plan    ASA 2     spinal     (  39w0d  )    Anesthetic plan, risks, benefits, and alternatives have been provided, discussed and informed consent has been obtained with: patient.        CODE STATUS:

## 2022-08-09 NOTE — LACTATION NOTE
This note was copied from a baby's chart.  Informed PT that LC is here to help with BF today. Offered assistance but mother declined, said she will call later, when baby is due to BF if she needs help. Reports infant is latching well so far.PT denies any questions and concerns at this time. Mom has PBP. Encouraged to call LC if needing further assistance.

## 2022-08-09 NOTE — INTERVAL H&P NOTE
H&P reviewed. The patient was examined and there are no changes to the H&P. Breast feeding planned.

## 2022-08-09 NOTE — L&D DELIVERY NOTE
Section Procedure Note    Indications: 39-week gestation G3, P1 Ab1 with history of previous  desiring repeat .    Pre-operative Diagnosis: 39 week 0 day pregnancy.  History of previous  desiring repeat     Post-operative Diagnosis: same    Surgeon: Patric Zhou MD     Assistants: Dr. Ferreira.  Dr. Ferreira was necessary to help with retraction, exposure, tying of knots, and delivery of the baby.    Anesthesia: Spinal anesthesia    ASA Class: 2    Procedure Details   The patient was seen in the Holding Room. The risks, benefits, complications, treatment options, and expected outcomes were discussed with the patient.  The patient concurred with the proposed plan, giving informed consent.  The site of surgery properly noted/marked. The patient was taken to Operating Room , identified as America Andres and the procedure verified as  Delivery. A Time Out was held and the above information confirmed.    After induction of anesthesia, the patient was draped and prepped in the usual sterile manner. A Pfannenstiel incision was made and carried down through the subcutaneous tissue to the fascia. Fascial incision was made and extended transversely. The fascia was  from the underlying rectus tissue superiorly and inferiorly. The peritoneum was identified and entered. Peritoneal incision was extended longitudinally. The utero-vesical peritoneal reflection was incised transversely and the bladder flap was bluntly freed from the lower uterine segment. A low transverse uterine incision was made. Delivered from vertex presentation was a 7 lb 11 oz infant female l with Apgar scores of 2 at one minute and 8 at five minutes. After 30 seconds the umbilical cord was clamped and cut cord blood was obtained for evaluation. The placenta was removed intact and appeared normal with 3 vessel cord. The uterine outline, tubes and ovaries appeared normal. The uterine incision was closed  with running locked sutures of 0 Vicryl suture.  A second imbricating layer of 0 Vicryl was then applied with a couple additional figure-of-eight sutures for good hemostasis.. Hemostasis was observed. Lavage was carried out until clear. The fascia was then reapproximated with running sutures of 0 Vicryl.  The muscles were approximated in the midline with interrupted 0 chromic sutures.  The skin was closed with subcuticular 4-0 Vicryl.  Pt transferred to recovery room in satisfactory condition. Pt received IV Ancef preop.  Blood type O   RH + rubella status immune, Breastfeeding.     Instrument, sponge, and needle counts were correct prior to the abdominal closure and at the conclusion of the case.     Findings:  Live viable female infant weighing 7 lbs  11 oz with apgars 2/8    Estimated Blood Loss: 564    Specimens: * No orders in the log *                   Complications: None; patient tolerated the procedure well.                    Condition: Stable    Attending Attestation: Patric Zhou MD

## 2022-08-09 NOTE — ANESTHESIA PROCEDURE NOTES
Spinal Block      Patient location during procedure: OR  Start Time: 8/9/2022 10:19 AM  Performed By  Anesthesiologist: Patric Street MD  Preanesthetic Checklist  Completed: patient identified, IV checked, site marked, risks and benefits discussed, surgical consent, monitors and equipment checked, pre-op evaluation and timeout performed  Spinal Block Prep:  Patient Position:sitting  Sterile Tech:cap, gloves, gown, mask and sterile barriers  Prep:Chloraprep  Patient Monitoring:blood pressure monitoring, continuous pulse oximetry and EKG  Spinal Block Procedure  Approach:midline  Location:L3-L4  Needle Type:Sprotte  Needle Gauge:24 G  Placement of Spinal needle event:cerebrospinal fluid aspirated  Paresthesia: no  Fluid Appearance:clear  Medications: fentaNYL citrate (PF) (SUBLIMAZE) injection, 15 mcg  Morphine PF injection, 200 mcg  bupivacaine PF (MARCAINE) 0.75 % injection, 1.8 mL  Med Administered at 8/9/2022 10:19 AM   Post Assessment  Patient Tolerance:patient tolerated the procedure well with no apparent complications  Complications no

## 2022-08-10 LAB
BASOPHILS # BLD AUTO: 0.03 10*3/MM3 (ref 0–0.2)
BASOPHILS NFR BLD AUTO: 0.3 % (ref 0–1.5)
DEPRECATED RDW RBC AUTO: 42.4 FL (ref 37–54)
EOSINOPHIL # BLD AUTO: 0.07 10*3/MM3 (ref 0–0.4)
EOSINOPHIL NFR BLD AUTO: 0.7 % (ref 0.3–6.2)
ERYTHROCYTE [DISTWIDTH] IN BLOOD BY AUTOMATED COUNT: 13.7 % (ref 12.3–15.4)
HCT VFR BLD AUTO: 32.4 % (ref 34–46.6)
HGB BLD-MCNC: 11.4 G/DL (ref 12–15.9)
IMM GRANULOCYTES # BLD AUTO: 0.07 10*3/MM3 (ref 0–0.05)
IMM GRANULOCYTES NFR BLD AUTO: 0.7 % (ref 0–0.5)
LYMPHOCYTES # BLD AUTO: 1.41 10*3/MM3 (ref 0.7–3.1)
LYMPHOCYTES NFR BLD AUTO: 13.2 % (ref 19.6–45.3)
MCH RBC QN AUTO: 30 PG (ref 26.6–33)
MCHC RBC AUTO-ENTMCNC: 35.2 G/DL (ref 31.5–35.7)
MCV RBC AUTO: 85.3 FL (ref 79–97)
MONOCYTES # BLD AUTO: 0.67 10*3/MM3 (ref 0.1–0.9)
MONOCYTES NFR BLD AUTO: 6.3 % (ref 5–12)
NEUTROPHILS NFR BLD AUTO: 78.8 % (ref 42.7–76)
NEUTROPHILS NFR BLD AUTO: 8.44 10*3/MM3 (ref 1.7–7)
NRBC BLD AUTO-RTO: 0 /100 WBC (ref 0–0.2)
PLATELET # BLD AUTO: 175 10*3/MM3 (ref 140–450)
PMV BLD AUTO: 9.8 FL (ref 6–12)
RBC # BLD AUTO: 3.8 10*6/MM3 (ref 3.77–5.28)
WBC NRBC COR # BLD: 10.69 10*3/MM3 (ref 3.4–10.8)

## 2022-08-10 PROCEDURE — 25010000002 KETOROLAC TROMETHAMINE PER 15 MG: Performed by: OBSTETRICS & GYNECOLOGY

## 2022-08-10 PROCEDURE — 85025 COMPLETE CBC W/AUTO DIFF WBC: CPT | Performed by: OBSTETRICS & GYNECOLOGY

## 2022-08-10 RX ADMIN — IBUPROFEN 600 MG: 600 TABLET ORAL at 21:16

## 2022-08-10 RX ADMIN — KETOROLAC TROMETHAMINE 15 MG: 15 INJECTION, SOLUTION INTRAMUSCULAR; INTRAVENOUS at 00:44

## 2022-08-10 RX ADMIN — KETOROLAC TROMETHAMINE 15 MG: 15 INJECTION, SOLUTION INTRAMUSCULAR; INTRAVENOUS at 06:48

## 2022-08-10 RX ADMIN — SERTRALINE HYDROCHLORIDE 50 MG: 50 TABLET, FILM COATED ORAL at 21:15

## 2022-08-10 RX ADMIN — BISACODYL 10 MG: 10 SUPPOSITORY RECTAL at 18:28

## 2022-08-10 RX ADMIN — DOCUSATE SODIUM 100 MG: 100 CAPSULE, LIQUID FILLED ORAL at 21:15

## 2022-08-10 RX ADMIN — KETOROLAC TROMETHAMINE 15 MG: 15 INJECTION, SOLUTION INTRAMUSCULAR; INTRAVENOUS at 12:43

## 2022-08-10 RX ADMIN — ACETAMINOPHEN 1000 MG: 500 TABLET, FILM COATED ORAL at 10:13

## 2022-08-10 RX ADMIN — ACETAMINOPHEN 1000 MG: 500 TABLET, FILM COATED ORAL at 03:36

## 2022-08-10 RX ADMIN — ACETAMINOPHEN 650 MG: 325 TABLET, FILM COATED ORAL at 16:00

## 2022-08-10 NOTE — LACTATION NOTE
"This note was copied from a baby's chart.  Mom replies that BF'g is \"going pretty good\" when asked.  Baby is with pacifier & mom getting ready to put baby to breast.  Mom states that baby doesn't take her right breast as well as the left & she has baby in cradle hold while attempting latch.  Suggested cross-cradle hold & explained that newborns do better with latch when given more upper back support.  Verbal guidance & hands on assistance at mom's request.  Infant latched initially but taking it slow & comes off for sneezing.  Mom educated on sandwiching breast across from baby's nose & chin & that this changes when baby's position at the breast changes.      Parents verbalized understanding, deny further questions/concerns at this time & will call for LC when help is needed.  LC # on WB.    "

## 2022-08-10 NOTE — PROGRESS NOTES
"Subjective    Postpartum Day 1:     The patient feels well.  Pain is well controlled with current medications. The baby is well.  Urinary output is adequate. The patient is ambulating well. The patient is tolerating a normal diet. Flatus has been passed.      Objective:    Vital signs in last 24 hours:  Blood pressure 128/81, pulse 97, temperature 98.3 °F (36.8 °C), temperature source Oral, resp. rate 16, height 170.2 cm (67\"), weight 105 kg (231 lb), last menstrual period 11/09/2021, SpO2 100 %, currently breastfeeding.      General:    alert, appears stated age and cooperative   Uterine Fundus:   firm   Incision:  healing well, no significant drainage, no dehiscence, no significant erythema     Labs    Rh + / Female infant    Hgb 13.3 --> 11.4    Assessment/Plan:.     Postpartum Day #1  - Patient making normal postoperative milestones.  Continue current care.      Fidel Barboza MD    "

## 2022-08-11 RX ADMIN — ACETAMINOPHEN 650 MG: 325 TABLET, FILM COATED ORAL at 17:18

## 2022-08-11 RX ADMIN — Medication 1 APPLICATION: at 20:41

## 2022-08-11 RX ADMIN — IBUPROFEN 600 MG: 600 TABLET ORAL at 18:45

## 2022-08-11 RX ADMIN — SERTRALINE HYDROCHLORIDE 50 MG: 50 TABLET, FILM COATED ORAL at 20:41

## 2022-08-11 RX ADMIN — ACETAMINOPHEN 650 MG: 325 TABLET, FILM COATED ORAL at 11:09

## 2022-08-11 RX ADMIN — IBUPROFEN 600 MG: 600 TABLET ORAL at 03:45

## 2022-08-11 RX ADMIN — ACETAMINOPHEN 650 MG: 325 TABLET, FILM COATED ORAL at 00:16

## 2022-08-11 RX ADMIN — Medication 1 TABLET: at 03:43

## 2022-08-11 RX ADMIN — ACETAMINOPHEN 650 MG: 325 TABLET, FILM COATED ORAL at 06:47

## 2022-08-11 RX ADMIN — Medication 1 TABLET: at 20:41

## 2022-08-11 RX ADMIN — IBUPROFEN 600 MG: 600 TABLET ORAL at 11:09

## 2022-08-11 RX ADMIN — DOCUSATE SODIUM 100 MG: 100 CAPSULE, LIQUID FILLED ORAL at 20:41

## 2022-08-11 NOTE — PLAN OF CARE
Goal Outcome Evaluation:  Plan of Care Reviewed With: patient           Outcome Evaluation: vitals stabe, pain controlled, ambulating independently, breastfeeding, pumping, and supplementing with formula. no concerns at this time

## 2022-08-11 NOTE — PROGRESS NOTES
Section Progress Note    Assessment & Plan     Status post  section: Doing well postoperatively.     Continue current care.    BP elevation: mild range, asymptomatic. Denies h/o medication for HTN in the past. Will continue to monitor  Rh status: O positive  Rubella: Immune  Gender: Female    Dispo: Plan for discharge tomorrow    Subjective     Postpartum Day 2:  Delivery    The patient feels well. The patient denies emotional concerns. Pain is well controlled with current medications. The baby iswell. The patient is ambulating well. The patient is tolerating a normal diet. Patient reports flatus.    Objective     Vital signs in last 24 hours:  Temp:  [97.8 °F (36.6 °C)-98.3 °F (36.8 °C)] 97.8 °F (36.6 °C)  Heart Rate:  [88-99] 99  Resp:  [16-18] 18  BP: (125-137)/(83-91) 137/91      General:    alert, appears stated age and cooperative   CV: Well perfused   Lungs:  no respiratory distress   Lochia:  appropriate   Uterine Fundus:   firm   Incision:  healing well, no significant drainage, no dehiscence, no significant erythema   DVT Evaluation:  No evidence of DVT seen on physical exam.     Lab Results   Component Value Date    WBC 10.69 08/10/2022    HGB 11.4 (L) 08/10/2022    HCT 32.4 (L) 08/10/2022    MCV 85.3 08/10/2022     08/10/2022         Cady Vicente MD  2022  12:24 EDT

## 2022-08-11 NOTE — LACTATION NOTE
Mom reports baby is BF well. She is using personal pump and so far has pump about 30 cc's of colostrum/milk. Baby has had some formula. Mom denies questions. She is wanting to get ruth ann pump through insurance. Encouraged to call  as needed    Lactation Consult Note    Evaluation Completed: 2022 15:23 EDT  Patient Name: America Andres  :  1990  MRN:  [id number]     REFERRAL  INFORMATION:                                         DELIVERY HISTORY:        Skin to skin initiation date/time:      Skin to skin end date/time:           MATERNAL ASSESSMENT:                               INFANT ASSESSMENT:  Information for the patient's :  Avtar Andres [[id number]]   No past medical history on file.                                                                                                     MATERNAL INFANT FEEDING:                                                                       EQUIPMENT TYPE:                                 BREAST PUMPING:          LACTATION REFERRALS:

## 2022-08-12 VITALS
HEIGHT: 67 IN | TEMPERATURE: 96.4 F | SYSTOLIC BLOOD PRESSURE: 111 MMHG | HEART RATE: 82 BPM | RESPIRATION RATE: 16 BRPM | DIASTOLIC BLOOD PRESSURE: 73 MMHG | OXYGEN SATURATION: 100 % | WEIGHT: 231 LBS | BODY MASS INDEX: 36.26 KG/M2

## 2022-08-12 PROCEDURE — 0503F POSTPARTUM CARE VISIT: CPT | Performed by: NURSE PRACTITIONER

## 2022-08-12 RX ORDER — OXYCODONE HYDROCHLORIDE AND ACETAMINOPHEN 5; 325 MG/1; MG/1
TABLET ORAL
Qty: 24 TABLET | Refills: 0 | Status: SHIPPED | OUTPATIENT
Start: 2022-08-12

## 2022-08-12 RX ORDER — PSEUDOEPHEDRINE HCL 30 MG
100 TABLET ORAL 2 TIMES DAILY PRN
Qty: 60 CAPSULE | Refills: 1 | Status: SHIPPED | OUTPATIENT
Start: 2022-08-12

## 2022-08-12 RX ORDER — IBUPROFEN 600 MG/1
600 TABLET ORAL EVERY 6 HOURS PRN
Qty: 90 TABLET | Refills: 1 | Status: SHIPPED | OUTPATIENT
Start: 2022-08-12

## 2022-08-12 RX ADMIN — ACETAMINOPHEN 650 MG: 325 TABLET, FILM COATED ORAL at 00:47

## 2022-08-12 RX ADMIN — MUPIROCIN 1 APPLICATION: 20 OINTMENT TOPICAL at 00:50

## 2022-08-12 RX ADMIN — ACETAMINOPHEN 650 MG: 325 TABLET, FILM COATED ORAL at 11:15

## 2022-08-12 RX ADMIN — IBUPROFEN 600 MG: 600 TABLET ORAL at 00:47

## 2022-08-12 RX ADMIN — IBUPROFEN 600 MG: 600 TABLET ORAL at 06:16

## 2022-08-12 NOTE — LACTATION NOTE
Patient c/o breast pain and MBRN asked LC to consult with patient and address her concerns. Patient and family sound asleep when LC arrived. There was a partially empty bottle of formula on baby's crib. There was a medela bottle containing 100cc of expressed breast milk. RN informed and will call LC when patient up and about.

## 2022-08-12 NOTE — LACTATION NOTE
"This note was copied from a baby's chart.  Called to bedside.  Mom questions about engorgement.  She says she is no longer latching baby because she feels her \"nipples are too big\" so she is just pumping.  She is concerned because her breasts are tender, firm, swollen.  Discussed engorgement management in detail.    Education provided:  infant weight loss & return to birth weight in 10-14 days (about 2 weeks); Pediatrician to follow infant’s weight; infant should be feeding at minimum 8 times/24 hours; expect infant to have at least 2 yellow poops by day 5 & at least 6 pees every day starting on day 5; expect full milk supply between 3-5 days after delivery; milk storage; engorgement management & duration; & availability of [facility] for appointments & questions.  Referred to breastfeeding information starting on page 35 in “Postpartum & [place] Care Guide.”  Verbalized understanding.     Mother denies questions/concerns at this time.  Encouraged to call for help if needed.     "

## 2022-08-12 NOTE — DISCHARGE SUMMARY
Date of Discharge:  2022    Discharge Diagnosis: s/p repeat  section    Presenting Problem/History of Present Illness  History of  [Z98.891]     Hospital Course  Patient is a 31 y.o. female presented for repeat  section. Her postpartum course was complicated by prior  section. She delivered a viable female infant weighing 7lb 11/5 oz with apgars of 2&8. For further details surrounding this delivery please see operative note. There were no surgical complications. Her postpartum course has been complicated by mild BP elevation. She has had a few mild range BP, overall mostly normal range. Denies HA, vision changes, RUQ pain. No hx HTN. Reviewed preeclampsia precautions. She is voiding adequately, passing flatus, and tolerating a regular diet. Her pain is well controlled. She desires discharge home today on postpartum day 3 and has met all milestones to do so.       Procedures Performed  Procedure(s):   SECTION REPEAT       Consults:   Consults     No orders found from 2022 to 8/10/2022.          Pertinent Test Results:   WBC   Date Value Ref Range Status   08/10/2022 10.69 3.40 - 10.80 10*3/mm3 Final     RBC   Date Value Ref Range Status   08/10/2022 3.80 3.77 - 5.28 10*6/mm3 Final     Hemoglobin   Date Value Ref Range Status   08/10/2022 11.4 (L) 12.0 - 15.9 g/dL Final     Hematocrit   Date Value Ref Range Status   08/10/2022 32.4 (L) 34.0 - 46.6 % Final     MCV   Date Value Ref Range Status   08/10/2022 85.3 79.0 - 97.0 fL Final     MCH   Date Value Ref Range Status   08/10/2022 30.0 26.6 - 33.0 pg Final     MCHC   Date Value Ref Range Status   08/10/2022 35.2 31.5 - 35.7 g/dL Final     RDW   Date Value Ref Range Status   08/10/2022 13.7 12.3 - 15.4 % Final     RDW-SD   Date Value Ref Range Status   08/10/2022 42.4 37.0 - 54.0 fl Final     MPV   Date Value Ref Range Status   08/10/2022 9.8 6.0 - 12.0 fL Final     Platelets   Date Value Ref Range Status    08/10/2022 175 140 - 450 10*3/mm3 Final     Neutrophil %   Date Value Ref Range Status   08/10/2022 78.8 (H) 42.7 - 76.0 % Final     Lymphocyte %   Date Value Ref Range Status   08/10/2022 13.2 (L) 19.6 - 45.3 % Final     Monocyte %   Date Value Ref Range Status   08/10/2022 6.3 5.0 - 12.0 % Final     Eosinophil %   Date Value Ref Range Status   08/10/2022 0.7 0.3 - 6.2 % Final     Basophil %   Date Value Ref Range Status   08/10/2022 0.3 0.0 - 1.5 % Final     Immature Grans %   Date Value Ref Range Status   08/10/2022 0.7 (H) 0.0 - 0.5 % Final     Neutrophils, Absolute   Date Value Ref Range Status   08/10/2022 8.44 (H) 1.70 - 7.00 10*3/mm3 Final     Lymphocytes, Absolute   Date Value Ref Range Status   08/10/2022 1.41 0.70 - 3.10 10*3/mm3 Final     Monocytes, Absolute   Date Value Ref Range Status   08/10/2022 0.67 0.10 - 0.90 10*3/mm3 Final     Eosinophils, Absolute   Date Value Ref Range Status   08/10/2022 0.07 0.00 - 0.40 10*3/mm3 Final     Basophils, Absolute   Date Value Ref Range Status   08/10/2022 0.03 0.00 - 0.20 10*3/mm3 Final     Immature Grans, Absolute   Date Value Ref Range Status   08/10/2022 0.07 (H) 0.00 - 0.05 10*3/mm3 Final     nRBC   Date Value Ref Range Status   08/10/2022 0.0 0.0 - 0.2 /100 WBC Final         Condition on Discharge:  stable    Vital Signs  Temp:  [96.4 °F (35.8 °C)-99 °F (37.2 °C)] 96.4 °F (35.8 °C)  Heart Rate:  [] 82  Resp:  [16-18] 16  BP: (111-129)/(72-87) 111/73    Physical Exam:   See Progress Note    Discharge Disposition  Home or Self Care    Discharge Medications     Discharge Medications      New Medications      Instructions Start Date   docusate sodium 100 MG capsule   100 mg, Oral, 2 Times Daily PRN      ibuprofen 600 MG tablet  Commonly known as: ADVIL,MOTRIN   600 mg, Oral, Every 6 Hours PRN      oxyCODONE-acetaminophen 5-325 MG per tablet  Commonly known as: Percocet   Take 1-2 tablets by mouth every 4-6 hours as needed for moderate pain          Continue These Medications      Instructions Start Date   Hydrocort-Pramoxine (Perianal) 2.5-1 % rectal cream  Commonly known as: ANALPRAM-HC   Apply to hemorrhoids 3 times daily as needed      Prenatal 27-1 27-1 MG tablet tablet   1 tablet, Oral, Daily      sertraline 50 MG tablet  Commonly known as: ZOLOFT   50 mg, Oral, Daily         Stop These Medications    acetaminophen 500 MG tablet  Commonly known as: TYLENOL            Discharge Diet:   Diet Instructions     Diet: Regular      Discharge Diet: Regular          Activity at Discharge:   Activity Instructions     Activity as Tolerated      Driving Restrictions      Type of Restriction: Driving    Driving Restrictions: No Driving While Taking Narcotics    Pelvic Rest            Follow-up Appointments  Future Appointments   Date Time Provider Department Center   8/25/2022  2:40 PM Patric Zhou MD [facility]     Additional Instructions for the Follow-ups that You Need to Schedule     Discharge Follow-up with PCP   As directed       Currently Documented PCP:    Rosie Moran APRN    PCP Phone Number:    [phone]     Follow Up Details: Abelardo               Test Results Pending at Discharge       KARINA Pillai  08/12/22  09:45 EDT    Time: 9:48am

## 2022-08-12 NOTE — PROGRESS NOTES
Section Progress Note    Assessment & Plan     1. Status post  section: Doing well postoperatively.   Discharge home with standard precautions and return to clinic in 2 weeks. Reviewed discharge instructions in detail    2.  BP elevation: Few mild range BP, mostly normal range. Denies HA, vision changes, RUQ pain. No hx HTN. Reviewed preeclampsia precautions    Rh status: O+  Rubella: Immune  Gender: Female  Covid: not detected    Subjective     Postpartum Day 3:  Delivery    The patient feels well. The patient denies emotional concerns. Pain is well controlled with current medications. The baby iswell.Urinary output is adequate. The patient is ambulating well. The patient is tolerating a normal diet. Patient reports passing flatus.    Objective     Vital signs in last 24 hours:  Temp:  [96.4 °F (35.8 °C)-99 °F (37.2 °C)] 96.4 °F (35.8 °C)  Heart Rate:  [] 82  Resp:  [16-18] 16  BP: (111-129)/(72-87) 111/73      General:    alert, appears stated age and cooperative   CV: RRR, no m/r/g   Lungs: CTAB, no wheezes, no respiratory distress   Bowel Sounds:  active   Lochia:  appropriate   Uterine Fundus:   firm   Incision:  healing well, no significant drainage, no dehiscence, no significant erythema   DVT Evaluation:  No evidence of DVT seen on physical exam.     Lab Results   Component Value Date    WBC 10.69 08/10/2022    HGB 11.4 (L) 08/10/2022    HCT 32.4 (L) 08/10/2022    MCV 85.3 08/10/2022     08/10/2022         Rossy Hare, KARINA  2022  09:40 EDT

## 2022-08-25 ENCOUNTER — OFFICE VISIT (OUTPATIENT)
Dept: OBSTETRICS AND GYNECOLOGY | Facility: CLINIC | Age: 32
End: 2022-08-25

## 2022-08-25 VITALS
WEIGHT: 206 LBS | HEIGHT: 67 IN | DIASTOLIC BLOOD PRESSURE: 80 MMHG | SYSTOLIC BLOOD PRESSURE: 114 MMHG | BODY MASS INDEX: 32.33 KG/M2

## 2022-08-25 DIAGNOSIS — Z09 POSTOPERATIVE FOLLOW-UP: Primary | ICD-10-CM

## 2022-08-25 PROCEDURE — 99024 POSTOP FOLLOW-UP VISIT: CPT | Performed by: OBSTETRICS & GYNECOLOGY

## 2022-08-25 NOTE — PROGRESS NOTES
"Subjective    Chief Complaint   Patient presents with   • Post-op     2wks pp c-sec      History of Present Illness    America Andres is a 31 y.o. female who presents for 2-week postop  visit.  Pap smear negative in January.  Currently breast-feeding.  No problems.  They want to try minipill after comes back for 6-week visit.    Obstetric History:  OB History        3    Para   2    Term   2            AB   1    Living   2       SAB   1    IAB        Ectopic        Molar        Multiple   0    Live Births   2               Menstrual History:     Patient's last menstrual period was 2021.       Past Medical History:   Diagnosis Date   • Anxiety      Family History   Problem Relation Age of Onset   • Breast cancer Mother        The following portions of the patient's history were reviewed and updated as appropriate: allergies, current medications, past medical history, past surgical history and problem list.    Review of Systems  Negative       Objective   Physical Exam  Incision healing extremely well  /80   Ht 170.2 cm (67\")   Wt 93.4 kg (206 lb)   LMP 2021   BMI 32.26 kg/m²     Assessment & Plan   Diagnoses and all orders for this visit:    1. Postoperative follow-up (Primary)        Return to office 4 weeks for postpartum visit.  Possible starting of minipill at this visit.             Answers for HPI/ROS submitted by the patient on 2022  Please describe your symptoms.: Post op checkup  Have you had these symptoms before?: No  How long have you been having these symptoms?: 1-2 weeks  What is the primary reason for your visit?: Other      "

## 2022-09-21 ENCOUNTER — TELEPHONE (OUTPATIENT)
Dept: OBSTETRICS AND GYNECOLOGY | Facility: CLINIC | Age: 32
End: 2022-09-21

## 2022-09-21 NOTE — TELEPHONE ENCOUNTER
You can see if Rossy has any slots available. Other than that we do not have anything till the week of Oct 3rd or after. Thanks

## 2022-09-21 NOTE — TELEPHONE ENCOUNTER
UNABLE TO WARM TRANSFER    Caller: America Andres    Relationship: Self    Best call back number: [phone]    What is the best time to reach you: ANYTIME    What was the call regarding: PT RETURNING MISSED CALL FROM THE OFFICE. PT NEEDS TO RESCHEDULE POSTPARTUM APPT    Do you require a callback: YES

## 2022-09-21 NOTE — TELEPHONE ENCOUNTER
Patient calling to reschedule postpartum appointment, should I put her in next available or can I place her in a open spot?            Please advise   thank you

## 2022-11-01 ENCOUNTER — POSTPARTUM VISIT (OUTPATIENT)
Dept: OBSTETRICS AND GYNECOLOGY | Facility: CLINIC | Age: 32
End: 2022-11-01

## 2022-11-01 VITALS
WEIGHT: 208 LBS | DIASTOLIC BLOOD PRESSURE: 67 MMHG | HEIGHT: 67 IN | BODY MASS INDEX: 32.65 KG/M2 | SYSTOLIC BLOOD PRESSURE: 118 MMHG

## 2022-11-01 DIAGNOSIS — N94.2 VAGINISMUS: ICD-10-CM

## 2022-11-01 PROCEDURE — 99212 OFFICE O/P EST SF 10 MIN: CPT | Performed by: OBSTETRICS & GYNECOLOGY

## 2022-11-01 PROCEDURE — 0503F POSTPARTUM CARE VISIT: CPT | Performed by: OBSTETRICS & GYNECOLOGY

## 2022-11-01 NOTE — PROGRESS NOTES
LANA Andres  is a 31 y.o. female who presents for 2 reasons.  First, she presents for postpartum checkup.  She is just under 3 months out from a  delivery.  Overall, she is feeling well.  Bowels and bladder are functioning normally.  The baby is doing well.  The baby is bottle and breast-feeding.  The patient has not had her first menstrual flow.  Next, for several years, the patient has experienced dyspareunia.  She denies vaginal dryness.  She is able to have an orgasm.  Her main problem is closing of the vagina on attempted penetration.  This has neither worsened, nor has it improved over the last several years    Chief Complaint   Patient presents with   • Postpartum Care     Patient is here for a 6 week postpartum.       Past Medical History:   Diagnosis Date   • Anxiety        Past Surgical History:   Procedure Laterality Date   •  SECTION     •  SECTION N/A 2022    Procedure:  SECTION REPEAT;  Surgeon: Patric Zhou MD;  Location: [facility] LABOR DELIVERY;  Service: Obstetrics/Gynecology;  Laterality: N/A;   • DILATATION AND CURETTAGE         Social History     Socioeconomic History   • Marital status:    Tobacco Use   • Smoking status: Never   Substance and Sexual Activity   • Alcohol use: Never   • Drug use: Never       The following portions of the patient's history were reviewed and updated as appropriate: allergies, current medications, past family history, past medical history, past social history, past surgical history and problem list.    Review of Systems      This is positive for dyspareunia.  It is negative for fever or chills.  Negative for nausea or vomiting.  Negative for unexplained weight change.  All other systems are reviewed and are negative.    Physical Exam  Vitals and nursing note reviewed.   Constitutional:       Appearance: She is well-developed.   HENT:      Head: Normocephalic and atraumatic.   Cardiovascular:      Rate and Rhythm:  Normal rate and regular rhythm.   Pulmonary:      Effort: Pulmonary effort is normal.      Breath sounds: Normal breath sounds. No wheezing or rales.   Chest:      Comments: The breasts are homogeneous.  There are no palpable lumps.  Nipple discharge and axillary adenopathy are absent.  Abdominal:      General: There is no distension.      Palpations: Abdomen is soft.      Tenderness: There is no abdominal tenderness.   Genitourinary:     Labia:         Right: No lesion.         Left: No lesion.       Vagina: Normal. No vaginal discharge.      Cervix: No cervical motion tenderness.      Uterus: Normal. Not enlarged and not tender.       Adnexa:         Right: No mass or tenderness.          Left: No mass or tenderness.     Skin:     General: Skin is warm and dry.   Neurological:      Mental Status: She is alert and oriented to person, place, and time.         Assessment    Diagnoses and all orders for this visit:    1. Routine postpartum follow-up (Primary)    2. Vaginismus  -     Ambulatory Referral to Physical Therapy Pelvic Floor        Plan  1. Appropriate progress 3 months postpartum.  Okay to resume all normal activities of daily living  2. Pap is due in January.  The patient has no family history of cervical cancer and does not have other risk factors for cervical cancer.  Never had an abnormal Pap.  She is considering deferring her Pap for a year.  3. Vaginismus.  Counseled regarding the pathophysiology of this condition and questions answered.  We also discussed recommendations for management.  I recommend pelvic floor muscle physical therapy and possible treatment with dilators.  The patient agrees with this recommendation.  A referral has been sent.    No follow-ups on file.    Social History     Tobacco Use   Smoking Status Never   Smokeless Tobacco Not on file

## 2023-03-28 RX ORDER — PRENATAL WITH FERROUS FUM AND FOLIC ACID 3080; 920; 120; 400; 22; 1.84; 3; 20; 10; 1; 12; 200; 27; 25; 2 [IU]/1; [IU]/1; MG/1; [IU]/1; MG/1; MG/1; MG/1; MG/1; MG/1; MG/1; UG/1; MG/1; MG/1; MG/1; MG/1
TABLET ORAL
Qty: 30 TABLET | Refills: 0 | Status: SHIPPED | OUTPATIENT
Start: 2023-03-28

## 2023-05-23 ENCOUNTER — OFFICE VISIT (OUTPATIENT)
Dept: OBSTETRICS AND GYNECOLOGY | Facility: CLINIC | Age: 33
End: 2023-05-23
Payer: COMMERCIAL

## 2023-05-23 VITALS
WEIGHT: 208 LBS | DIASTOLIC BLOOD PRESSURE: 70 MMHG | BODY MASS INDEX: 32.65 KG/M2 | HEIGHT: 67 IN | SYSTOLIC BLOOD PRESSURE: 120 MMHG

## 2023-05-23 DIAGNOSIS — F32.A DEPRESSION, UNSPECIFIED DEPRESSION TYPE: Primary | ICD-10-CM

## 2023-05-23 NOTE — PROGRESS NOTES
LANA   America Andres  is a 32 y.o. female who presents to discuss symptoms of depression.  She reports that ever since her baby was born 9 months ago, she feels a lack of motivation.  Occasionally, she feels helpless and hopeless.  She is gaining weight and has lost interest in activities outside the home.  Denies suicidal or homicidal ideation.  Thyroid was checked in October and it was normal.    Chief Complaint   Patient presents with   • Follow-up     Patient is here for a f/u to discuss hormones.       Past Medical History:   Diagnosis Date   • Anxiety        Past Surgical History:   Procedure Laterality Date   •  SECTION     •  SECTION N/A 2022    Procedure:  SECTION REPEAT;  Surgeon: Patric Zhou MD;  Location: [facility] LABOR DELIVERY;  Service: Obstetrics/Gynecology;  Laterality: N/A;   • DILATATION AND CURETTAGE         Social History     Socioeconomic History   • Marital status:    Tobacco Use   • Smoking status: Never   Substance and Sexual Activity   • Alcohol use: Never   • Drug use: Never       The following portions of the patient's history were reviewed and updated as appropriate: allergies, current medications, past family history, past medical history, past social history, past surgical history and problem list.    Review of Systems  This is positive for helplessness and hopelessness.  It is negative for suicidal or homicidal ideation.  It is positive for cold intolerance.  All other systems are reviewed and are negative.        Physical Exam  Vitals and nursing note reviewed.   Constitutional:       Appearance: Normal appearance.   Neurological:      Mental Status: She is alert and oriented to person, place, and time.         Assessment    Diagnoses and all orders for this visit:    1. Depression, unspecified depression type (Primary)  -     Ambulatory Referral to Psychology        Plan  1. I counseled the patient regarding depression and its management.  30  minutes were spent in direct face-to-face counseling on this issue.  The patient has tried Zoloft.  Her dose was gradually increased until it reached 150 mg.  The patient did not feel any relief on Zoloft and is currently weaning off of it.  She is down to 25 mg daily.  We discussed other options.  The patient is concerned about weight gain.  We discussed the benefits and risks of using Wellbutrin.  The patient declines this for now.  We also discussed the benefits and risks of counseling.  The patient would like to proceed with this.  A referral has been sent.  Follow-up in 6 weeks to assess progress.  The patient was advised to contact me sooner if she has any change in her symptoms.    2. Return in about 6 weeks (around 7/4/2023).    Social History     Tobacco Use   Smoking Status Never   Smokeless Tobacco Not on file   3.

## 2024-04-03 ENCOUNTER — HOSPITAL ENCOUNTER (EMERGENCY)
Facility: HOSPITAL | Age: 34
Discharge: HOME | End: 2024-04-03
Attending: EMERGENCY MEDICINE | Admitting: EMERGENCY MEDICINE
Payer: COMMERCIAL

## 2024-04-03 ENCOUNTER — APPOINTMENT (OUTPATIENT)
Dept: GENERAL RADIOLOGY | Facility: HOSPITAL | Age: 34
End: 2024-04-03
Payer: COMMERCIAL

## 2024-04-03 VITALS
RESPIRATION RATE: 18 BRPM | HEART RATE: 88 BPM | OXYGEN SATURATION: 97 % | WEIGHT: 189 LBS | HEIGHT: 67 IN | BODY MASS INDEX: 29.66 KG/M2 | TEMPERATURE: 98.9 F | SYSTOLIC BLOOD PRESSURE: 134 MMHG | DIASTOLIC BLOOD PRESSURE: 106 MMHG

## 2024-04-03 DIAGNOSIS — S83.92XA SPRAIN OF LEFT KNEE, UNSPECIFIED LIGAMENT, INITIAL ENCOUNTER: Primary | ICD-10-CM

## 2024-04-03 PROCEDURE — 73562 X-RAY EXAM OF KNEE 3: CPT

## 2024-04-03 PROCEDURE — 99283 EMERGENCY DEPT VISIT LOW MDM: CPT

## 2024-04-03 RX ORDER — IBUPROFEN 400 MG/1
800 TABLET ORAL ONCE
Status: COMPLETED | OUTPATIENT
Start: 2024-04-03 | End: 2024-04-03

## 2024-04-03 RX ADMIN — IBUPROFEN 800 MG: 400 TABLET, FILM COATED ORAL at 21:18

## 2024-04-03 NOTE — Clinical Note
[facility] EMERGENCY ROOM  [address] [place] [address]  Phone: [phone]  Fax: [phone]    America Andres was seen and treated in our emergency department on 4/3/2024.  She may return to work on 04/05/2024.         Thank you for choosing [facility].    Kai Sidhu MD

## 2024-04-03 NOTE — Clinical Note
[facility] EMERGENCY ROOM  [address] [place] [address]  Phone: [phone]  Fax: [phone]    America Andres was seen and treated in our emergency department on 4/3/2024.  She may return to work on 04/05/2024.         Thank you for choosing [facility].    Kai Sidhu MD

## 2024-04-04 ENCOUNTER — TELEPHONE (OUTPATIENT)
Dept: ORTHOPEDIC SURGERY | Facility: CLINIC | Age: 34
End: 2024-04-04
Payer: COMMERCIAL

## 2024-04-04 NOTE — DISCHARGE INSTRUCTIONS
X-ray did not show any signs of fracture or dislocation.    I have placed an orthopedic referral to for follow-up for reevaluation and possible MRI if deemed warranted.    Wear knee immobilizer.  Use crutches for ambulation.  Rest.  Ice.    Medication as prescribed for pain

## 2024-04-04 NOTE — ED PROVIDER NOTES
Time: 8:40 PM EDT  Date of encounter:  4/3/2024  Independent Historian/Clinical History and Information was obtained by:   Patient    History is limited by: N/A    Chief Complaint: Knee injury      History of Present Illness:  Patient is a 33 y.o. year old female who presents to the emergency department for evaluation of left knee pain and injury.  Patient states she was just walking in socks on a hardwood floor when she slipped and her kneecap dislocated.  She pushed it back in and has had pain since.  Rates her pain about a 7 out of 10.  She has had a history of this happening in the past but has not happened for a long time.  Patient complaining of medial left knee pain since.  She states she is able to ambulate but it is painful.  No numbness tingling or weakness.  No other injury    HPI    Patient Care Team  Primary Care Provider: Rosie Moran APRN    Past Medical History:     No Known Allergies  Past Medical History:   Diagnosis Date    Anxiety     Anxiety      Past Surgical History:   Procedure Laterality Date     SECTION       SECTION N/A 2022    Procedure:  SECTION REPEAT;  Surgeon: Patric Zhou MD;  Location: [facility] LABOR DELIVERY;  Service: Obstetrics/Gynecology;  Laterality: N/A;    DILATATION AND CURETTAGE       Family History   Problem Relation Age of Onset    Breast cancer Mother        Home Medications:  Prior to Admission medications    Medication Sig Start Date End Date Taking? Authorizing Provider   buPROPion XL (WELLBUTRIN XL) 150 MG 24 hr tablet Take 1 tablet by mouth Daily. 23   Provider, MD Mike        Social History:   Social History     Tobacco Use    Smoking status: Never    Smokeless tobacco: Never   Vaping Use    Vaping status: Never Used   Substance Use Topics    Alcohol use: Never    Drug use: Never         Review of Systems:  Review of Systems   Musculoskeletal:  Positive for arthralgias (Left knee) and gait problem (Painful). Negative  "for joint swelling.   Skin:  Negative for color change and wound.   Neurological:  Negative for weakness and numbness.   Hematological: Negative.    Psychiatric/Behavioral: Negative.     All other systems reviewed and are negative.       Physical Exam:  BP (!) 134/106 (BP Location: Left arm, Patient Position: Sitting)   Pulse 88   Temp 98.9 °F (37.2 °C) (Oral)   Resp 18   Ht 170.2 cm (67\")   Wt 85.7 kg (189 lb)   SpO2 97%   BMI 29.60 kg/m²     Physical Exam  Vitals and nursing note reviewed.   Constitutional:       Appearance: Normal appearance.   HENT:      Nose: Nose normal.      Mouth/Throat:      Mouth: Mucous membranes are moist.   Eyes:      Conjunctiva/sclera: Conjunctivae normal.   Cardiovascular:      Pulses: Normal pulses.   Pulmonary:      Effort: Pulmonary effort is normal.   Musculoskeletal:         General: Tenderness (Tenderness medial upper aspect of left knee) present. No swelling.      Cervical back: Normal range of motion.      Comments: Extension of left knee is painful   Skin:     General: Skin is warm and dry.      Capillary Refill: Capillary refill takes less than 2 seconds.   Neurological:      General: No focal deficit present.      Mental Status: She is alert.      Sensory: No sensory deficit.      Motor: No weakness.   Psychiatric:         Mood and Affect: Mood normal.         Behavior: Behavior normal.              Medical Decision Making:      Comorbidities that affect care:    Anxiety depression    External Notes reviewed:    Previous Clinic Note: Patient's last visit was for routine mammogram screening February 23 due to family history      The following orders were placed and all results were independently analyzed by me:  Orders Placed This Encounter   Procedures    [facility] 05.  Knee Immobilizer, 11.  Crutches; Prevents Completion of MRADLs Within Reasonable Time Frame; Able to Safely Use Equipment; Mobility Deficit Can Be Sufficiently Resolved By Use of " Equipment    XR Knee 3 View Left    Ambulatory Referral to Orthopedic Surgery    Obtain & Apply The Following- Lower extremity; Knee immobilizer    Crutches (fit & training)       Medications Given in the Emergency Department:  Medications   ibuprofen (ADVIL,MOTRIN) tablet 800 mg (800 mg Oral Given 4/3/24 2118)        ED Course:    ED Course as of 04/03/24 2153   Wed Apr 03, 2024 2149 XR Knee 3 View Left  No acute fracture or malalignment [DS]      ED Course User Index  [DS] Pamela Yvonne, KARINA       Labs:    Lab Results (last 24 hours)       ** No results found for the last 24 hours. **             Imaging:    XR Knee 3 View Left    Result Date: 4/3/2024  XR KNEE 3 VW LEFT-  Date of Exam: 4/3/2024 8:55 PM  Indication: injury  Comparison: None available.  Findings: No fracture or malalignment is identified. Projecting over the proximal tibial shaft on the lateral view is a rounded sclerotic focus measuring 1.8 cm x 1.4 cm. There is subtle cortical thickening along the medial aspect of the proximal tibial shaft. No joint effusion or loose body is identified. No significant arthritic change is seen.      Impression: 1.  No acute fracture or malalignment 2.  Indeterminate sclerotic focus in the proximal tibial shaft. Given the patient's pain, consider further evaluation with a nonemergent MRI .   Electronically Signed By-Jono Vasquez MD On:4/3/2024 9:45 PM         Differential Diagnosis and Discussion:    Extremity Pain: Differential diagnosis includes but is not limited to soft tissue sprain, tendonitis, tendon injury, dislocation, fracture, deep vein thrombosis, arterial insufficiency, osteoarthritis, bursitis, and ligamentous damage.    All X-rays impressions were independently interpreted by me.    MDM  Number of Diagnoses or Management Options  Sprain of left knee, unspecified ligament, initial encounter  Diagnosis management comments: The patient's symptoms are consistent with a strain vs. sprain.      A muscle  strain, also known as a pulled muscle, is an injury that occurs when a muscle is overstretched or torn, often as a result of fatigue, overuse, or improper use. This can result in pain, swelling, and a limited range of motion.     A sprain, on the other hand, is an injury to a ligament, which is the tissue that connects bones to each other. Sprains often occur in joints like the ankle or wrist when they are twisted or impacted in a way that stretches or tears the ligaments. Symptoms of a sprain can include pain, swelling, bruising, and a decreased ability to move the joint.     The patient was counseled to use rest, ice, and elevation and follow-up with their PCP or an orthopedic surgeon.       Amount and/or Complexity of Data Reviewed  Tests in the radiology section of CPT®: reviewed and ordered  Tests in the medicine section of CPT®: ordered and reviewed    Risk of Complications, Morbidity, and/or Mortality  Presenting problems: low  Diagnostic procedures: low  Management options: low    Patient Progress  Patient progress: stable           Patient Care Considerations:    NARCOTICS: I considered prescribing opiate pain medication as an outpatient, however no acute bony abnormality noted      Consultants/Shared Management Plan:    None    Social Determinants of Health:    Patient is independent, reliable, and has access to care.       Disposition and Care Coordination:    Discharged: I considered escalation of care by admitting this patient to the hospital, however managing did not reveal any acute injury that would require hospitalization or transfer    I have explained the patient´s condition, diagnoses and treatment plan based on the information available to me at this time. I have answered questions and addressed any concerns. The patient has a good  understanding of the patient´s diagnosis, condition, and treatment plan as can be expected at this point. The vital signs have been stable. The patient´s condition  is stable and appropriate for discharge from the emergency department.      The patient will pursue further outpatient evaluation with the primary care physician or other designated or consulting physician as outlined in the discharge instructions. They are agreeable to this plan of care and follow-up instructions have been explained in detail. The patient has received these instructions in written format and has expressed an understanding of the discharge instructions. The patient is aware that any significant change in condition or worsening of symptoms should prompt an immediate return to this or the closest emergency department or call to 1.  I have explained discharge medications and the need for follow up with the patient/caretakers. This was also printed in the discharge instructions. Patient was discharged with the following medications and follow up:      Medication List        New Prescriptions      diclofenac 50 MG EC tablet  Commonly known as: VOLTAREN  Take 1 tablet by mouth 3 (Three) Times a Day As Needed (pain).               Where to Get Your Medications        These medications were sent to [address], [place] - [address] [place] [address] - [phone] [facility] [phone]   [address] [facility] [address]      Phone: [phone]   diclofenac 50 MG EC tablet      [facility]  [address]  [facility] [address]  [phone]           Final diagnoses:   Sprain of left knee, unspecified ligament, initial encounter        ED Disposition       ED Disposition   Discharge    Condition   Stable    Comment   --               This medical record created using voice recognition software.             Yvonne Santiago APRN  04/03/24 2386

## 2024-04-04 NOTE — TELEPHONE ENCOUNTER
Sprain of left knee, unspecified ligament, initial encounter - XRAY 4/3/24 - PN 4/3/24 ER - DR BRUNO ON CALL - PER REF TOOL DR BRUNO DOES NOT SEE KNEE SPRAIN - SCHD REV

## 2024-04-09 ENCOUNTER — OFFICE VISIT (OUTPATIENT)
Dept: ORTHOPEDIC SURGERY | Facility: CLINIC | Age: 34
End: 2024-04-09
Payer: COMMERCIAL

## 2024-04-09 VITALS — WEIGHT: 189 LBS | HEIGHT: 67 IN | BODY MASS INDEX: 29.66 KG/M2 | OXYGEN SATURATION: 97 % | HEART RATE: 87 BPM

## 2024-04-09 DIAGNOSIS — M89.9 BONE LESION: ICD-10-CM

## 2024-04-09 DIAGNOSIS — S83.005A DISLOCATION OF LEFT PATELLA, INITIAL ENCOUNTER: Primary | ICD-10-CM

## 2024-04-09 NOTE — PROGRESS NOTES
"Chief Complaint  Pain and Initial Evaluation of the Left Knee     Subjective      America Andres presents to [facility] for evaluation of the left knee. She reports she dislocated her left knee on 4/3/24 and she reduced it herself. She was seen and evaluated with x-rays. She reports it did it twice in highschool. She has swelling and pain with ROM.       No Known Allergies     Social History     Socioeconomic History    Marital status:    Tobacco Use    Smoking status: Never    Smokeless tobacco: Never   Vaping Use    Vaping status: Never Used   Substance and Sexual Activity    Alcohol use: Never    Drug use: Never        I reviewed the patient's chief complaint, history of present illness, review of systems, past medical history, surgical history, family history, social history, medications, and allergy list.     Review of Systems     Constitutional: Denies fevers, chills, weight loss  Cardiovascular: Denies chest pain, shortness of breath  Skin: Denies rashes, acute skin changes  Neurologic: Denies headache, loss of consciousness  MSK: Left knee pain      Vital Signs:   Pulse 87   Ht 170.2 cm (67\")   Wt 85.7 kg (189 lb)   SpO2 97%   BMI 29.60 kg/m²          Physical Exam  General: Alert. No acute distress    Ortho Exam      Left knee- Positive EHL, FHL, GS and TA. Sensation intact to all 5 nerves of the foot. Positive pulses. ROM -5 to 85 degrees. Mild swelling. Small effusion. Tender to the medial patella. 1+ lateral patella translation. Stable to varus/valgus stress. Stable to anterior/posterior drawer. Negative Lachman's. Negative Mihir's. Bruising to the anterior medial knee.     Procedures        Imaging Results (Most Recent)       None             Result Review :       XR Knee 3 View Left    Result Date: 4/3/2024  Narrative: XR KNEE 3 VW LEFT-  Date of Exam: 4/3/2024 8:55 PM  Indication: injury  Comparison: None available.  Findings: No fracture or malalignment is " identified. Projecting over the proximal tibial shaft on the lateral view is a rounded sclerotic focus measuring 1.8 cm x 1.4 cm. There is subtle cortical thickening along the medial aspect of the proximal tibial shaft. No joint effusion or loose body is identified. No significant arthritic change is seen.      Impression: Impression: 1.  No acute fracture or malalignment 2.  Indeterminate sclerotic focus in the proximal tibial shaft. Given the patient's pain, consider further evaluation with a nonemergent MRI .   Electronically Signed By-Jono Vasquez MD On:4/3/2024 9:45 PM              Assessment and Plan     Diagnoses and all orders for this visit:    1. Dislocation of left patella, initial encounter (Primary)    2. Bone lesion, proximal tibia        Discussed the treatment plan with the patient.  Plan for MRI with and without contrast to evaluate for internal derangement and the lesion. The patient expressed understanding and wished to proceed. Lateral J brace given today.     Call or return if worsening symptoms.    Follow Up     MRI results      Patient was given instructions and counseling regarding her condition or for health maintenance advice. Please see specific information pulled into the AVS if appropriate.     Scribed for Patric Reilly MD by Carmen Poole.  04/09/24   09:20 EDT    I have personally performed the services described in this document as scribed by the above individual and it is both accurate and complete. Patric Reilly MD 04/09/24

## 2024-04-10 ENCOUNTER — PATIENT ROUNDING (BHMG ONLY) (OUTPATIENT)
Dept: ORTHOPEDIC SURGERY | Facility: CLINIC | Age: 34
End: 2024-04-10
Payer: COMMERCIAL

## 2024-04-10 NOTE — PROGRESS NOTES
A [employer] message has been sent to the patient for PATIENT R[place]ING with [facility].

## 2024-05-10 ENCOUNTER — HOSPITAL ENCOUNTER (OUTPATIENT)
Dept: MRI IMAGING | Facility: HOSPITAL | Age: 34
Discharge: HOME | End: 2024-05-10
Admitting: ORTHOPAEDIC SURGERY
Payer: COMMERCIAL

## 2024-05-10 DIAGNOSIS — S83.005A DISLOCATION OF LEFT PATELLA, INITIAL ENCOUNTER: ICD-10-CM

## 2024-05-10 DIAGNOSIS — M89.9 BONE LESION: ICD-10-CM

## 2024-05-10 PROCEDURE — 73721 MRI JNT OF LWR EXTRE W/O DYE: CPT

## 2024-05-21 ENCOUNTER — OFFICE VISIT (OUTPATIENT)
Dept: ORTHOPEDIC SURGERY | Facility: CLINIC | Age: 34
End: 2024-05-21
Payer: COMMERCIAL

## 2024-05-21 VITALS
BODY MASS INDEX: 29.66 KG/M2 | DIASTOLIC BLOOD PRESSURE: 84 MMHG | HEIGHT: 67 IN | SYSTOLIC BLOOD PRESSURE: 118 MMHG | HEART RATE: 81 BPM | OXYGEN SATURATION: 97 % | WEIGHT: 189 LBS

## 2024-05-21 DIAGNOSIS — S83.005A DISLOCATION OF LEFT PATELLA, INITIAL ENCOUNTER: Primary | ICD-10-CM

## 2024-05-21 DIAGNOSIS — M25.362 PATELLAR INSTABILITY OF LEFT KNEE: ICD-10-CM

## 2024-05-21 NOTE — PROGRESS NOTES
"Chief Complaint  Follow-up of the Left Knee     Subjective      America Andres presents to [facility] for a follow up for her left knee. She was last seen in the office on 04/09/24 where we ordered an MRI on her left knee. She initally injured her left knee on 04/03/24 where her knee dislocated. She reports she was able to reduce it herself. She reports pain when laying down and with rest.     No Known Allergies     Social History     Socioeconomic History    Marital status:    Tobacco Use    Smoking status: Never    Smokeless tobacco: Never   Vaping Use    Vaping status: Never Used   Substance and Sexual Activity    Alcohol use: Never    Drug use: Never        I reviewed the patient's chief complaint, history of present illness, review of systems, past medical history, surgical history, family history, social history, medications, and allergy list.     Review of Systems     Constitutional: Denies fevers, chills, weight loss  Cardiovascular: Denies chest pain, shortness of breath  Skin: Denies rashes, acute skin changes  Neurologic: Denies headache, loss of consciousness  MSK: Left knee pain       Vital Signs:   /84   Pulse 81   Ht 170.2 cm (67\")   Wt 85.7 kg (189 lb)   SpO2 97%   BMI 29.60 kg/m²          Physical Exam  General: Alert. No acute distress    Ortho Exam      Left lower extremity: skin is warm, dry and intact,  Positive EHL, FHL, GS and TA. Sensation intact to all 5 nerves of the foot. Positive pulses. ROM -5 to 85 degrees. Mild swelling. Small effusion. Tender to the medial patella. 1+ lateral patella translation. Stable to varus/valgus stress. Stable to anterior/posterior drawer. Negative Lachman's. Negative Mihir's. Bruising to the anterior medial knee.      Procedures      Imaging Results (Most Recent)       None             Result Review :       MRI Knee Left Without Contrast    Result Date: 5/12/2024  Narrative: MRI KNEE LEFT  WO CONTRAST-  Date " of Exam: 5/10/2024 6:25 PM  Indication: left knee pain; S83.005A-Unspecified dislocation of left patella, initial encounter; M89.9-Disorder of bone, unspecified.  Comparison: Three-view left knee dated 4/3/2024  Technique:  Routine multiplanar/multisequence images of the left knee were obtained without contrast administration.   Findings: The proximal tibial shaft where a sclerotic focus was noted on previous radiograph is not imaged on this study.  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 There is mild to moderate lateral patellar tilt. T2 high signal consistent with edema is noted predominantly in the medial aspect of the patella and at lateral aspect of the lateral femoral condyle. Smaller such foci are also noted in the posterolateral tibial plateau, anteromedial tibial plateau and medial aspect of the medial femoral condyle. No fracture is seen.  No meniscal tear is demonstrated. The cruciate ligaments are intact. Edema surrounds the medial collateral ligament which itself appears intact, consistent with a grade 1 sprain. The lateral collateral ligament complex, lateral patellar retinaculum and extensor mechanism appear intact. The medial patellar retinaculum and medial patellofemoral ligament are torn. There is mild to moderate edema noted along the medial aspect of the joint.  A small knee joint effusion is noted. Cartilage in the joint appears intact. No loose body is seen.  No popliteal cyst is seen.      Impression: Impression: 1.  Constellation of findings suggestive of previous patellar dislocation including tears of the medial patellar retinaculum and medial patellofemoral ligament. 2.  Multiple osseous contusions, as above. 3.  Hypoplasia of the intertrochlear groove 4.  Grade 1 MCL sprain   Electronically Signed By-Jono Vasquez MD On:5/12/2024 8:30 AM      US Pelvic Complete, Abd + Endovag    Result Date: 5/3/2024  Narrative: REVIEWING YOUR TEST RESULTS IN [facility] IS NOT A SUBSTITUTE  FOR DISCUSSING THOSE RESULTS WITH YOUR HEALTH CARE PROVIDER. PLEASE CONTACT YOUR PROVIDER VIA [employer] TO DISCUSS ANY QUESTIONS OR CONCERNS YOU MAY HAVE REGARDING THESE TEST RESULTS.  RADIOLOGY REPORT FACILITY:  [facility] UNIT/AGE/GENDER: SHERITA  OP      AGE:33 Y          SEX:F PATIENT NAME/:  OLENA CANO    1990 UNIT NUMBER:  [id number] ACCOUNT NUMBER:  [id number] ACCESSION NUMBER:  [id number] EXAMINATION: US PELVIS COMPLETE, TRANSABD + TRANSVAG DATE: 2024 HISTORY: History of ovarian cysts and hirsutism. 33-year-old female,  last menstrual period 2024 COMPARISON: None. Procedure:  Real-time, multiplanar gray scale and color flow imaging of the pelvis was performed via transvaginal and transabdominal approach. Findings: The uterus is retroverted and measures 7.3 cm x 4 cm x 4.6 cm transabdominal. The uterus measures 7.3 cm x 3.1 cm x 4.1 cm endovaginal. The endometrial stripe measures 0.8 cm, homogeneous. The right ovary measures 1.9 cm x 2.2 cm x 2.1 cm. The right ovary contains a dominant follicle measuring 1.3 cm x 1.5 cm x 1.5 cm. The left ovary measures 2.2 cm x 1.9 cm x 3.4 cm, only seen on transabdominal. Spectral doppler demonstrates normal vascular flow in both ovaries. There is a small amount of free fluid in the cul-de-sac, most likely physiologic. Impression: 1. Essentially normal pelvic ultrasound for a premenopausal female. Dictated by: Geri Frazier M.D. Images and Report reviewed and interpreted by: Geri Frazier M.D. <PS><Electronically signed by: Geri Frazier M.D.> 2024 1236 D: 2024 1230 T: 2024 1230            Assessment and Plan     Diagnoses and all orders for this visit:    1. Dislocation of left patella, initial encounter (Primary)    2. Patellar instability of left knee        The patient presents here today for a follow up for her left knee. MRI results were discussed with the patient today.      Discussed operative versus non operative treatment options with the patient. Patient wishes to proceed with conservative treatment options with the patient.     Order for physical therapy placed today.     She will continue taking the diclofenac.     Call or return if worsening symptoms.    Follow Up     6 weeks       Patient was given instructions and counseling regarding her condition or for health maintenance advice. Please see specific information pulled into the AVS if appropriate.     Scribed for Patric Reilly MD by Kamille Baker.  05/21/24   08:48 EDT    I have personally performed the services described in this document as scribed by the above individual and it is both accurate and complete. Patric Reilly MD 05/22/24

## 2024-07-16 ENCOUNTER — TELEPHONE (OUTPATIENT)
Dept: ORTHOPEDIC SURGERY | Facility: CLINIC | Age: 34
End: 2024-07-16
Payer: COMMERCIAL

## 2024-07-16 NOTE — TELEPHONE ENCOUNTER
LISA OUT OF OFFICE IN THE AFTERNOON ON 08/01. LVM FOR PT TO CALL BACK AND RESCHEDULE. OK FOR HUB TO SCHEDULE NEXT AVAILABLE.

## 2025-06-05 PROCEDURE — 87086 URINE CULTURE/COLONY COUNT: CPT

## 2025-07-05 PROCEDURE — 87086 URINE CULTURE/COLONY COUNT: CPT | Performed by: NURSE PRACTITIONER

## 2025-07-05 PROCEDURE — 87186 SC STD MICRODIL/AGAR DIL: CPT | Performed by: NURSE PRACTITIONER

## 2025-07-05 PROCEDURE — 87077 CULTURE AEROBIC IDENTIFY: CPT | Performed by: NURSE PRACTITIONER

## 2025-08-04 PROCEDURE — 81515 NFCT DS BV&VAGINITIS DNA ALG: CPT

## 2025-08-04 PROCEDURE — 87086 URINE CULTURE/COLONY COUNT: CPT

## (undated) DEVICE — ANTIBACTERIAL UNDYED BRAIDED (POLYGLACTIN 910), SYNTHETIC ABSORBABLE SUTURE: Brand: COATED VICRYL

## (undated) DEVICE — KENDALL SCD EXPRESS SLEEVES, KNEE LENGTH, MEDIUM: Brand: KENDALL SCD

## (undated) DEVICE — GLV SURG TRIUMPH CLASSIC PF LTX 7 STRL

## (undated) DEVICE — SUT GUT CHRM 0 CT 27IN 914H

## (undated) DEVICE — 3M(TM) TEGADERM(TM) TRANSPARENT FILM DRESSING FRAME STYLE 1627: Brand: 3M™ TEGADERM™

## (undated) DEVICE — SUT VIC 4/0 KS 27IN VCP662H

## (undated) DEVICE — SOL IRR H2O BTL 1000ML STRL